# Patient Record
Sex: MALE | Race: WHITE | HISPANIC OR LATINO | Employment: FULL TIME | URBAN - METROPOLITAN AREA
[De-identification: names, ages, dates, MRNs, and addresses within clinical notes are randomized per-mention and may not be internally consistent; named-entity substitution may affect disease eponyms.]

---

## 2019-01-04 ENCOUNTER — TELEPHONE (OUTPATIENT)
Dept: SCHEDULING | Facility: IMAGING CENTER | Age: 44
End: 2019-01-04

## 2019-01-08 ENCOUNTER — OFFICE VISIT (OUTPATIENT)
Dept: MEDICAL GROUP | Facility: PHYSICIAN GROUP | Age: 44
End: 2019-01-08
Payer: COMMERCIAL

## 2019-01-08 ENCOUNTER — HOSPITAL ENCOUNTER (OUTPATIENT)
Facility: MEDICAL CENTER | Age: 44
End: 2019-01-08
Attending: FAMILY MEDICINE
Payer: COMMERCIAL

## 2019-01-08 VITALS
HEART RATE: 79 BPM | RESPIRATION RATE: 12 BRPM | DIASTOLIC BLOOD PRESSURE: 70 MMHG | OXYGEN SATURATION: 95 % | WEIGHT: 171 LBS | SYSTOLIC BLOOD PRESSURE: 122 MMHG | BODY MASS INDEX: 22.66 KG/M2 | HEIGHT: 73 IN | TEMPERATURE: 98.4 F

## 2019-01-08 DIAGNOSIS — F31.9 BIPOLAR 1 DISORDER (HCC): ICD-10-CM

## 2019-01-08 DIAGNOSIS — R35.0 URINARY FREQUENCY: ICD-10-CM

## 2019-01-08 DIAGNOSIS — Z13.220 ENCOUNTER FOR LIPID SCREENING FOR CARDIOVASCULAR DISEASE: ICD-10-CM

## 2019-01-08 DIAGNOSIS — Z13.6 ENCOUNTER FOR LIPID SCREENING FOR CARDIOVASCULAR DISEASE: ICD-10-CM

## 2019-01-08 LAB
APPEARANCE UR: CLEAR
BILIRUB UR STRIP-MCNC: NEGATIVE MG/DL
COLOR UR AUTO: YELLOW
GLUCOSE UR STRIP.AUTO-MCNC: NEGATIVE MG/DL
KETONES UR STRIP.AUTO-MCNC: NORMAL MG/DL
LEUKOCYTE ESTERASE UR QL STRIP.AUTO: NEGATIVE
NITRITE UR QL STRIP.AUTO: NEGATIVE
PH UR STRIP.AUTO: 6 [PH] (ref 5–8)
PROT UR QL STRIP: NEGATIVE MG/DL
RBC UR QL AUTO: NEGATIVE
SP GR UR STRIP.AUTO: 1.03
UROBILINOGEN UR STRIP-MCNC: NEGATIVE MG/DL

## 2019-01-08 PROCEDURE — 87086 URINE CULTURE/COLONY COUNT: CPT

## 2019-01-08 PROCEDURE — 81002 URINALYSIS NONAUTO W/O SCOPE: CPT | Performed by: FAMILY MEDICINE

## 2019-01-08 PROCEDURE — 99204 OFFICE O/P NEW MOD 45 MIN: CPT | Performed by: FAMILY MEDICINE

## 2019-01-08 RX ORDER — SULFAMETHOXAZOLE AND TRIMETHOPRIM 800; 160 MG/1; MG/1
1 TABLET ORAL 2 TIMES DAILY
Qty: 14 TAB | Refills: 0 | Status: SHIPPED | OUTPATIENT
Start: 2019-01-08 | End: 2019-01-22 | Stop reason: SDUPTHER

## 2019-01-08 ASSESSMENT — PATIENT HEALTH QUESTIONNAIRE - PHQ9: CLINICAL INTERPRETATION OF PHQ2 SCORE: 0

## 2019-01-08 NOTE — ASSESSMENT & PLAN NOTE
He was diagnosed with bipolar type 1 in the past, about 10 years ago.  He has been off the medications for almost 8 years due to lifestyle changes.  He denies active symptoms in the last 8 years.

## 2019-01-08 NOTE — ASSESSMENT & PLAN NOTE
He has had about 6 months of urinary symptoms.  He describes urinary frequency and dysuria and some feeling of incomplete emptying.  He had some clear discharge that resolved.  He denies fevers or back pain.  He was tested for ghonorrhea, trich, chlamydia and herpes and tests were negative since symptoms started.    He is an ultrarunner and cross trains with cycling.

## 2019-01-11 LAB
BACTERIA UR CULT: NORMAL
SIGNIFICANT IND 70042: NORMAL
SITE SITE: NORMAL
SOURCE SOURCE: NORMAL

## 2019-01-13 NOTE — PROGRESS NOTES
CC:  Urinary issues    HISTORY OF THE PRESENT ILLNESS: Patient is a 43 y.o. male. This pleasant patient is here today discuss the following and establish care    Health Maintenance: Completed      Bipolar 1 disorder (HCC)  He was diagnosed with bipolar type 1 in the past, about 10 years ago.  He has been off the medications for almost 8 years due to lifestyle changes.  He denies active symptoms in the last 8 years.    Urinary frequency  He has had about 6 months of urinary symptoms.  He describes urinary frequency and dysuria and some feeling of incomplete emptying.  He had some clear discharge that resolved.  He denies fevers or back pain.  He was tested for ghonorrhea, trich, chlamydia and herpes and tests were negative since symptoms started.    He is an ultrarunner and cross trains with cycling.      Allergies: Ciprofloxacin    Current Outpatient Prescriptions Ordered in [facility]   Medication Sig Dispense Refill   • sulfamethoxazole-trimethoprim (BACTRIM DS) 800-160 MG tablet Take 1 Tab by mouth 2 times a day. 14 Tab 0   • therapeutic multivitamin-minerals (THERAGRAN-M) TABS Take 1 Tab by mouth every day.       No current [facility]-ordered facility-administered medications on file.        Past Medical History:   Diagnosis Date   • Psychiatric problem     HX OF BIPOLAR   • Sleep apnea     PREVIOUS HX--NONE SINCE WT LOSS  LBS       Past Surgical History:   Procedure Laterality Date   • KNEE ARTHROSCOPY  11/4/2013    Performed by Ammon Quezada III, M.D. at SURGERY [facility]   • DENTAL EXTRACTION(S)     • NASAL FRACTURE REDUCTION CLOSED         Social History   Substance Use Topics   • Smoking status: Former Smoker     Packs/day: 1.50     Years: 20.00     Types: Cigarettes     Quit date: 11/1/2008   • Smokeless tobacco: Never Used   • Alcohol use No       Social History     Social History Narrative    Ultrarunner - [employer]           Family History   Problem Relation Age of Onset   • Heart  "Attack Father 38   • Heart Disease Father    • Hyperlipidemia Father    • Hypertension Father    • Breast Cancer Maternal Grandmother    • Diabetes Paternal Grandfather        ROS:     - Constitutional: Negative for fever, chills, unexpected weight change, and fatigue/generalized weakness.     - HEENT: Negative for headaches, vision changes, hearing changes, ear pain, ear discharge, rhinorrhea, sinus congestion, sore throat, and neck pain.      - Respiratory: Negative for cough, sputum production, chest congestion, dyspnea, wheezing, and crackles.      - Cardiovascular: Negative for chest pain, palpitations, orthopnea, and bilateral lower extremity edema.     - Gastrointestinal: Negative for heartburn, nausea, vomiting, abdominal pain, hematochezia, melena, diarrhea, constipation, and greasy/foul-smelling stools.     - Genitourinary: Negative for  hematuria, pyuria and urinary incontinence.    - Musculoskeletal: Negative for myalgias, back pain, and joint pain.     - Skin: Negative for rash, itching, cyanotic skin color change.     - Neurological: Negative for dizziness, tingling, tremors, focal sensory deficit, focal weakness and headaches.     - Endo/Heme/Allergies: Does not bruise/bleed easily.     - Psychiatric/Behavioral: Negative for depression, suicidal/homicidal ideation and memory loss.        Exam: Blood pressure 122/70, pulse 79, temperature 36.9 °C (98.4 °F), resp. rate 12, height 1.854 m (6' 1\"), weight 77.6 kg (171 lb), SpO2 95 %. Body mass index is 22.56 kg/m².    General: Normal appearing. No distress.  HEENT: Normocephalic. Eyes conjunctiva clear lids without ptosis, pupils equal and reactive to light accommodation, ears normal shape and contour, canals are clear bilaterally, tympanic membranes are benign, nasal mucosa benign, oropharynx is without erythema, edema or exudates.   Neck: Supple without JVD or bruit. Thyroid is not enlarged.  Pulmonary: Clear to ausculation.  Normal effort. No rales, " ronchi, or wheezing.  Cardiovascular: Regular rate and rhythm without murmur. Carotid and radial pulses are intact and equal bilaterally.  Abdomen: Soft, nontender, nondistended. Normal bowel sounds. Liver and spleen are not palpable  Prostate: (chaperone: Herve Velasco) mildly boggy and enlarged prostate without masses, tenderness lasted after the exam  Neurologic: Grossly nonfocal  Lymph: No cervical, supraclavicular or axillary lymph nodes are palpable  Skin: Warm and dry.  No obvious lesions.  Musculoskeletal: Normal gait. No extremity cyanosis, clubbing, or edema.  Psych: Normal mood and affect. Alert and oriented x3. Judgment and insight is normal.    Please note that this dictation was created using voice recognition software. I have made every reasonable attempt to correct obvious errors, but I expect that there are errors of grammar and possibly content that I did not discover before finalizing the note.      Assessment/Plan  1. Urinary frequency  Symptoms are recurrent and infection testing has been negative with the except of urine culture which I have not seen results for.  Given his age and exam prostatitis is possible.  We will start with antibiotics and urine culture.  I have asked him to avoid long bike rides for now.  We will check PSA as well.  If symptoms persist BPH is possible and flomax could be considered at follow up.  - POCT Urinalysis  - PROSTATE SPECIFIC AG SCREENING; Future  - URINE CULTURE(NEW); Future  - sulfamethoxazole-trimethoprim (BACTRIM DS) 800-160 MG tablet; Take 1 Tab by mouth 2 times a day.  Dispense: 14 Tab; Refill: 0    2. Bipolar 1 disorder (HCC)  Symptoms are stable with lifestyle management.  We will monitor, he is not a danger to himself..    3. Encounter for lipid screening for cardiovascular disease  Routine screening ordered with follow up labs for his urinary symptoms  - Lipid Profile; Future

## 2019-01-17 ENCOUNTER — HOSPITAL ENCOUNTER (OUTPATIENT)
Dept: LAB | Facility: MEDICAL CENTER | Age: 44
End: 2019-01-17
Attending: FAMILY MEDICINE
Payer: COMMERCIAL

## 2019-01-17 DIAGNOSIS — Z13.220 ENCOUNTER FOR LIPID SCREENING FOR CARDIOVASCULAR DISEASE: ICD-10-CM

## 2019-01-17 DIAGNOSIS — Z13.6 ENCOUNTER FOR LIPID SCREENING FOR CARDIOVASCULAR DISEASE: ICD-10-CM

## 2019-01-17 DIAGNOSIS — R35.0 URINARY FREQUENCY: ICD-10-CM

## 2019-01-17 LAB
CHOLEST SERPL-MCNC: 212 MG/DL (ref 100–199)
FASTING STATUS PATIENT QL REPORTED: NORMAL
HDLC SERPL-MCNC: 100 MG/DL
LDLC SERPL CALC-MCNC: 105 MG/DL
PSA SERPL-MCNC: 0.54 NG/ML (ref 0–4)
TRIGL SERPL-MCNC: 35 MG/DL (ref 0–149)

## 2019-01-17 PROCEDURE — 80061 LIPID PANEL: CPT

## 2019-01-17 PROCEDURE — 36415 COLL VENOUS BLD VENIPUNCTURE: CPT

## 2019-01-17 PROCEDURE — 84153 ASSAY OF PSA TOTAL: CPT

## 2019-01-22 ENCOUNTER — OFFICE VISIT (OUTPATIENT)
Dept: MEDICAL GROUP | Facility: PHYSICIAN GROUP | Age: 44
End: 2019-01-22
Payer: COMMERCIAL

## 2019-01-22 VITALS
TEMPERATURE: 98.4 F | WEIGHT: 178 LBS | HEART RATE: 78 BPM | OXYGEN SATURATION: 98 % | HEIGHT: 73 IN | BODY MASS INDEX: 23.59 KG/M2 | SYSTOLIC BLOOD PRESSURE: 124 MMHG | DIASTOLIC BLOOD PRESSURE: 78 MMHG | RESPIRATION RATE: 14 BRPM

## 2019-01-22 DIAGNOSIS — Z23 NEED FOR VACCINATION: ICD-10-CM

## 2019-01-22 DIAGNOSIS — E78.2 MIXED HYPERLIPIDEMIA: ICD-10-CM

## 2019-01-22 DIAGNOSIS — N41.1 CHRONIC PROSTATITIS: ICD-10-CM

## 2019-01-22 DIAGNOSIS — R35.0 URINARY FREQUENCY: ICD-10-CM

## 2019-01-22 PROBLEM — N41.9 PROSTATITIS: Status: ACTIVE | Noted: 2019-01-08

## 2019-01-22 PROCEDURE — 99214 OFFICE O/P EST MOD 30 MIN: CPT | Mod: 25 | Performed by: FAMILY MEDICINE

## 2019-01-22 PROCEDURE — 90686 IIV4 VACC NO PRSV 0.5 ML IM: CPT | Performed by: FAMILY MEDICINE

## 2019-01-22 PROCEDURE — 90471 IMMUNIZATION ADMIN: CPT | Performed by: FAMILY MEDICINE

## 2019-01-22 RX ORDER — SULFAMETHOXAZOLE AND TRIMETHOPRIM 800; 160 MG/1; MG/1
1 TABLET ORAL 2 TIMES DAILY
Qty: 14 TAB | Refills: 0 | Status: SHIPPED | OUTPATIENT
Start: 2019-01-22 | End: 2019-10-08

## 2019-01-22 NOTE — ASSESSMENT & PLAN NOTE
The 10-year ASCVD risk score (Zoraida DUMOTN Jr., et al., 2013) is: 0.9%    Values used to calculate the score:      Age: 44 years      Sex: Male      Is Non- : No      Diabetic: No      Tobacco smoker: No      Systolic Blood Pressure: 124 mmHg      Is BP treated: No      HDL Cholesterol: 100 mg/dL      Total Cholesterol: 212 mg/dL    He has a family history of early heart disease, his father having a heart attack at age 38.

## 2019-01-22 NOTE — PROGRESS NOTES
CC: I'm feeling better    HISTORY OF PRESENT ILLNESS: Patient is a 44 y.o. male established patient who presents today to discuss the following    Health Maintenance: Completed    Mixed hyperlipidemia  The 10-year ASCVD risk score (Zoraida DUMONT Jr., et al., 2013) is: 0.9%    Values used to calculate the score:      Age: 44 years      Sex: Male      Is Non- : No      Diabetic: No      Tobacco smoker: No      Systolic Blood Pressure: 124 mmHg      Is BP treated: No      HDL Cholesterol: 100 mg/dL      Total Cholesterol: 212 mg/dL    He has a family history of early heart disease, his father having a heart attack at age 38.      Prostatitis  He has completed 2 weeks of bactrim after last visit for suspected prostatitis.  He reports improvement in his urinary frequency on this but has some residual symptoms.  He denies fevers, blood in the urine or abdominal pain.  He does drink coffee.  His labs were all normal including a urine culture and PSA level.      Patient Active Problem List    Diagnosis Date Noted   • Mixed hyperlipidemia 01/22/2019   • Bipolar 1 disorder (HCC) 01/08/2019   • Prostatitis 01/08/2019      Allergies:Ciprofloxacin    Current Outpatient Prescriptions   Medication Sig Dispense Refill   • sulfamethoxazole-trimethoprim (BACTRIM DS) 800-160 MG tablet Take 1 Tab by mouth 2 times a day. 14 Tab 0   • therapeutic multivitamin-minerals (THERAGRAN-M) TABS Take 1 Tab by mouth every day.       No current facility-administered medications for this visit.        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           Family History   Problem Relation Age of Onset   • Heart Attack Father 38   • Heart Disease Father    • Hyperlipidemia  "Father    • Hypertension Father    • Breast Cancer Maternal Grandmother    • Diabetes Paternal Grandfather        Review of Systems:      - Constitutional: Negative for fever, chills, unexpected weight change, and fatigue/generalized weakness.     - Cardiovascular: Negative for chest pain, palpitations, orthopnea, and bilateral lower extremity edema.     - Gastrointestinal: Negative for heartburn, nausea, vomiting, abdominal pain, hematochezia, melena, diarrhea, constipation, and greasy/foul-smelling stools.     - Genitourinary: Negative for dysuria, polyuria, hematuria, pyuria, urinary urgency, and urinary incontinence.       Exam:    Blood pressure 124/78, pulse 78, temperature 36.9 °C (98.4 °F), resp. rate 14, height 1.854 m (6' 1\"), weight 80.7 kg (178 lb), SpO2 98 %. Body mass index is 23.48 kg/m².    General:  Well nourished, well developed male in NAD    Please note that this dictation was created using voice recognition software. I have made every reasonable attempt to correct obvious errors, but I expect that there are errors of grammar and possibly content that I did not discover before finalizing the note.    Assessment/Plan:  1. Mixed hyperlipidemia  his 10 year cardiovascular risk is not elevated.  The recommendations regarding treatment of elevated cholesterol were reviewed.  he does not meet criteria for treatment at this time with aspirin and a statin.  Lifestyle interventions including exercise and a diet low in trans and saturated fat and added cholesterol were reviewed.  We will continue to monitor every 1-5 years.       2. Urinary frequency  He has experienced improvement on bactrim after two weeks but has residual symptoms.  I suspect prostatitis as the most likely cause.  Bladder irritability or BPH less likely given recent improvement.  Cancer unlikely with a normal PSA.  - sulfamethoxazole-trimethoprim (BACTRIM DS) 800-160 MG tablet; Take 1 Tab by mouth 2 times a day.  Dispense: 14 Tab; " Refill: 0    3. Need for vaccination  - Flu Quad Inj >3 Year Pre-Filled PF    4. Chronic prostatitis  We will extend the course of antibiotics an additional two weeks which is not uncommonly required in prostatitis.  I have encouraged him to take a probiotic while on antibiotics, which he says he is taking already.  Potential food irritants including coffee, citrus and spicy foods were also reviewed and he should consider avoiding them if he does not improve after the antibiotics.

## 2019-01-22 NOTE — ASSESSMENT & PLAN NOTE
He has completed 2 weeks of bactrim after last visit for suspected prostatitis.  He reports improvement in his urinary frequency on this but has some residual symptoms.  He denies fevers, blood in the urine or abdominal pain.  He does drink coffee.  His labs were all normal including a urine culture and PSA level.

## 2019-10-08 ENCOUNTER — OFFICE VISIT (OUTPATIENT)
Dept: MEDICAL GROUP | Facility: PHYSICIAN GROUP | Age: 44
End: 2019-10-08
Payer: COMMERCIAL

## 2019-10-08 VITALS
RESPIRATION RATE: 12 BRPM | DIASTOLIC BLOOD PRESSURE: 68 MMHG | HEART RATE: 58 BPM | SYSTOLIC BLOOD PRESSURE: 110 MMHG | WEIGHT: 175 LBS | BODY MASS INDEX: 23.7 KG/M2 | HEIGHT: 72 IN | OXYGEN SATURATION: 98 % | TEMPERATURE: 98 F

## 2019-10-08 DIAGNOSIS — R22.42 LEG MASS, LEFT: ICD-10-CM

## 2019-10-08 DIAGNOSIS — M79.605 LEFT LEG PAIN: ICD-10-CM

## 2019-10-08 PROCEDURE — 99214 OFFICE O/P EST MOD 30 MIN: CPT | Performed by: FAMILY MEDICINE

## 2019-10-08 NOTE — PROGRESS NOTES
"  CC: left leg pain    HISTORY OF PRESENT ILLNESS: Patient is a 44 y.o. male established patient who presents today to discuss the following new problem    Health Maintenance: Completed    Left leg pain  He had sudden onset of left posterior thigh pain that started during a run.  He gets swelling in the hamstrings when running, but not spinning or walking.  This started about 3.5 weeks ago.  The pain is only present with running, but is better when wearing leggings.  There is associated swelling in the posterior calf and bruising.  He has had knee surgery and there is posterior knee \"tightness.\"      Patient Active Problem List    Diagnosis Date Noted   • Left leg pain 10/08/2019   • Mixed hyperlipidemia 01/22/2019   • Bipolar 1 disorder (HCC) 01/08/2019   • Prostatitis 01/08/2019      Allergies:Ciprofloxacin    Current Outpatient Medications   Medication Sig Dispense Refill   • sulfamethoxazole-trimethoprim (BACTRIM DS) 800-160 MG tablet Take 1 Tab by mouth 2 times a day. 14 Tab 0   • therapeutic multivitamin-minerals (THERAGRAN-M) TABS Take 1 Tab by mouth every day.       No current facility-administered medications for this visit.        Social History     Tobacco Use   • Smoking status: Former Smoker     Packs/day: 1.50     Years: 20.00     Pack years: 30.00     Types: Cigarettes     Last attempt to quit: 11/1/2008     Years since quitting: 10.9   • Smokeless tobacco: Never Used   Substance Use Topics   • Alcohol use: No   • Drug use: No     Social History     Social History Narrative    [employer]ar[employer] - [employer]           Family History   Problem Relation Age of Onset   • Heart Attack Father 38   • Heart Disease Father    • Hyperlipidemia Father    • Hypertension Father    • Breast Cancer Maternal Grandmother    • Diabetes Paternal Grandfather        Review of Systems:      - Constitutional: Negative for fever, chills, unexpected weight change, and fatigue/generalized weakness.     - Skin: " "Negative for rash, itching, cyanotic skin color change.     - Neurological: Negative for dizziness, tingling, tremors, focal sensory deficit, focal weakness and headaches.         Exam:    /68   Pulse (!) 58   Temp 36.7 °C (98 °F)   Resp 12   Ht 1.816 m (5' 11.5\")   Wt 79.4 kg (175 lb)   SpO2 98%  Body mass index is 24.07 kg/m².    General:  Well nourished, well developed male in NAD  Head is grossly normal.  Musculoskeletal: left posterior leg with lateral fullness, pain with resisted knee flexion, palpation reveals induration in the lateral hamstrings extending to just proximal to the knee, no joint effusion or bursa, no transillumination  Skin: bruising of the left posterior knee and upper calf      Assessment/Plan:  1. Left leg pain  Symptoms are most suggestive of a muscle tear with hematoma which would explain the pain with resisted flexion, his symptoms and the induration.  There are no symptoms to suggest infection, but with the palpable mass I would like to evaluate further.  It will also be helpful given his athletic status.  I have asked him to limit exercise to activities that do not provoke the pain for now and we will get an MRI.  - MR-FEMUR-W/O LEFT; Future    2. Leg mass, left  Possibly hematoma, but would like to evaluate further to rule out less likely dangerous causes as malignancy or other mass as there was no definite trauma to cause this.  - MR-FEMUR-W/O LEFT; Future         "

## 2019-10-08 NOTE — ASSESSMENT & PLAN NOTE
"He had sudden onset of left posterior thigh pain that started during a run.  He gets swelling in the hamstrings when running, but not spinning or walking.  This started about 3.5 weeks ago.  The pain is only present with running, but is better when wearing leggings.  There is associated swelling in the posterior calf and bruising.  He has had knee surgery and there is posterior knee \"tightness.\"  "

## 2019-10-11 ENCOUNTER — HOSPITAL ENCOUNTER (OUTPATIENT)
Dept: RADIOLOGY | Facility: MEDICAL CENTER | Age: 44
End: 2019-10-11
Attending: FAMILY MEDICINE
Payer: COMMERCIAL

## 2019-10-11 DIAGNOSIS — F31.9 BIPOLAR 1 DISORDER (HCC): ICD-10-CM

## 2019-10-11 NOTE — PROGRESS NOTES
Patient has hx of bipolar disorder. He requested referral to psychiatry.   PCP is on vacation. Psychiatry referral placed.

## 2019-11-07 ENCOUNTER — HOSPITAL ENCOUNTER (OUTPATIENT)
Dept: RADIOLOGY | Facility: MEDICAL CENTER | Age: 44
End: 2019-11-07
Attending: FAMILY MEDICINE
Payer: COMMERCIAL

## 2019-11-07 DIAGNOSIS — M79.605 LEFT LEG PAIN: ICD-10-CM

## 2019-11-07 DIAGNOSIS — R22.42 LEG MASS, LEFT: ICD-10-CM

## 2019-11-07 PROCEDURE — A9576 INJ PROHANCE MULTIPACK: HCPCS | Performed by: FAMILY MEDICINE

## 2019-11-07 PROCEDURE — 73720 MRI LWR EXTREMITY W/O&W/DYE: CPT | Mod: LT

## 2019-11-07 PROCEDURE — 700117 HCHG RX CONTRAST REV CODE 255: Performed by: FAMILY MEDICINE

## 2019-11-07 RX ADMIN — GADOTERIDOL 15 ML: 279.3 INJECTION, SOLUTION INTRAVENOUS at 08:52

## 2019-11-26 ENCOUNTER — OFFICE VISIT (OUTPATIENT)
Dept: MEDICAL GROUP | Facility: PHYSICIAN GROUP | Age: 44
End: 2019-11-26
Payer: COMMERCIAL

## 2019-11-26 ENCOUNTER — HOSPITAL ENCOUNTER (OUTPATIENT)
Dept: LAB | Facility: MEDICAL CENTER | Age: 44
End: 2019-11-26
Attending: FAMILY MEDICINE
Payer: COMMERCIAL

## 2019-11-26 VITALS
DIASTOLIC BLOOD PRESSURE: 66 MMHG | SYSTOLIC BLOOD PRESSURE: 124 MMHG | TEMPERATURE: 98.1 F | RESPIRATION RATE: 16 BRPM | OXYGEN SATURATION: 98 % | WEIGHT: 176 LBS | HEART RATE: 60 BPM | BODY MASS INDEX: 23.33 KG/M2 | HEIGHT: 73 IN

## 2019-11-26 DIAGNOSIS — Z00.00 WELLNESS EXAMINATION: ICD-10-CM

## 2019-11-26 DIAGNOSIS — E78.2 MIXED HYPERLIPIDEMIA: ICD-10-CM

## 2019-11-26 DIAGNOSIS — Z23 NEED FOR VACCINATION: ICD-10-CM

## 2019-11-26 LAB
ABO GROUP BLD: NORMAL
ALBUMIN SERPL BCP-MCNC: 4.3 G/DL (ref 3.2–4.9)
ALBUMIN/GLOB SERPL: 1.6 G/DL
ALP SERPL-CCNC: 45 U/L (ref 30–99)
ALT SERPL-CCNC: 30 U/L (ref 2–50)
ANION GAP SERPL CALC-SCNC: 6 MMOL/L (ref 0–11.9)
AST SERPL-CCNC: 27 U/L (ref 12–45)
BASOPHILS # BLD AUTO: 0.7 % (ref 0–1.8)
BASOPHILS # BLD: 0.03 K/UL (ref 0–0.12)
BILIRUB SERPL-MCNC: 0.8 MG/DL (ref 0.1–1.5)
BUN SERPL-MCNC: 20 MG/DL (ref 8–22)
CALCIUM SERPL-MCNC: 9.3 MG/DL (ref 8.5–10.5)
CHLORIDE SERPL-SCNC: 106 MMOL/L (ref 96–112)
CHOLEST SERPL-MCNC: 220 MG/DL (ref 100–199)
CO2 SERPL-SCNC: 30 MMOL/L (ref 20–33)
CREAT SERPL-MCNC: 0.88 MG/DL (ref 0.5–1.4)
EOSINOPHIL # BLD AUTO: 0.07 K/UL (ref 0–0.51)
EOSINOPHIL NFR BLD: 1.5 % (ref 0–6.9)
ERYTHROCYTE [DISTWIDTH] IN BLOOD BY AUTOMATED COUNT: 45.2 FL (ref 35.9–50)
FASTING STATUS PATIENT QL REPORTED: NORMAL
GLOBULIN SER CALC-MCNC: 2.7 G/DL (ref 1.9–3.5)
GLUCOSE SERPL-MCNC: 79 MG/DL (ref 65–99)
HCT VFR BLD AUTO: 43.9 % (ref 42–52)
HDLC SERPL-MCNC: 100 MG/DL
HGB BLD-MCNC: 15 G/DL (ref 14–18)
IMM GRANULOCYTES # BLD AUTO: 0 K/UL (ref 0–0.11)
IMM GRANULOCYTES NFR BLD AUTO: 0 % (ref 0–0.9)
LDLC SERPL CALC-MCNC: 113 MG/DL
LYMPHOCYTES # BLD AUTO: 1.86 K/UL (ref 1–4.8)
LYMPHOCYTES NFR BLD: 40.8 % (ref 22–41)
MCH RBC QN AUTO: 33 PG (ref 27–33)
MCHC RBC AUTO-ENTMCNC: 34.2 G/DL (ref 33.7–35.3)
MCV RBC AUTO: 96.5 FL (ref 81.4–97.8)
MONOCYTES # BLD AUTO: 0.48 K/UL (ref 0–0.85)
MONOCYTES NFR BLD AUTO: 10.5 % (ref 0–13.4)
NEUTROPHILS # BLD AUTO: 2.12 K/UL (ref 1.82–7.42)
NEUTROPHILS NFR BLD: 46.5 % (ref 44–72)
NRBC # BLD AUTO: 0 K/UL
NRBC BLD-RTO: 0 /100 WBC
PLATELET # BLD AUTO: 131 K/UL (ref 164–446)
PMV BLD AUTO: 10 FL (ref 9–12.9)
POTASSIUM SERPL-SCNC: 4.3 MMOL/L (ref 3.6–5.5)
PROT SERPL-MCNC: 7 G/DL (ref 6–8.2)
RBC # BLD AUTO: 4.55 M/UL (ref 4.7–6.1)
RH BLD: NORMAL
SODIUM SERPL-SCNC: 142 MMOL/L (ref 135–145)
TRIGL SERPL-MCNC: 36 MG/DL (ref 0–149)
WBC # BLD AUTO: 4.6 K/UL (ref 4.8–10.8)

## 2019-11-26 PROCEDURE — 36415 COLL VENOUS BLD VENIPUNCTURE: CPT

## 2019-11-26 PROCEDURE — 80053 COMPREHEN METABOLIC PANEL: CPT

## 2019-11-26 PROCEDURE — 90472 IMMUNIZATION ADMIN EACH ADD: CPT | Performed by: FAMILY MEDICINE

## 2019-11-26 PROCEDURE — 90715 TDAP VACCINE 7 YRS/> IM: CPT | Performed by: FAMILY MEDICINE

## 2019-11-26 PROCEDURE — 80061 LIPID PANEL: CPT

## 2019-11-26 PROCEDURE — 99396 PREV VISIT EST AGE 40-64: CPT | Mod: 25 | Performed by: FAMILY MEDICINE

## 2019-11-26 PROCEDURE — 85025 COMPLETE CBC W/AUTO DIFF WBC: CPT

## 2019-11-26 PROCEDURE — 86901 BLOOD TYPING SEROLOGIC RH(D): CPT

## 2019-11-26 PROCEDURE — 90471 IMMUNIZATION ADMIN: CPT | Performed by: FAMILY MEDICINE

## 2019-11-26 PROCEDURE — 86900 BLOOD TYPING SEROLOGIC ABO: CPT

## 2019-11-26 PROCEDURE — 90686 IIV4 VACC NO PRSV 0.5 ML IM: CPT | Performed by: FAMILY MEDICINE

## 2019-11-26 SDOH — HEALTH STABILITY: PHYSICAL HEALTH: ON AVERAGE, HOW MANY DAYS PER WEEK DO YOU ENGAGE IN MODERATE TO STRENUOUS EXERCISE (LIKE A BRISK WALK)?: 6 DAYS

## 2019-11-26 SDOH — HEALTH STABILITY: PHYSICAL HEALTH: ON AVERAGE, HOW MANY MINUTES DO YOU ENGAGE IN EXERCISE AT THIS LEVEL?: 90 MIN

## 2019-11-26 SDOH — HEALTH STABILITY: MENTAL HEALTH
STRESS IS WHEN SOMEONE FEELS TENSE, NERVOUS, ANXIOUS, OR CAN'T SLEEP AT NIGHT BECAUSE THEIR MIND IS TROUBLED. HOW STRESSED ARE YOU?: NOT AT ALL

## 2019-11-26 NOTE — ASSESSMENT & PLAN NOTE
Lab Results   Component Value Date/Time    CHOLSTRLTOT 212 (H) 01/17/2019 08:52 AM     (H) 01/17/2019 08:52 AM     01/17/2019 08:52 AM    TRIGLYCERIDE 35 01/17/2019 08:52 AM       Last LDL was slightly high.  He does have a family history of early heart disease in his dad.

## 2019-11-26 NOTE — ASSESSMENT & PLAN NOTE
He is here for a wellness visit today.  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  He denies any concerns about stress.  He is feeling well and has not concerns.    He would like to get wellness labs ordered and is curious about his blood type.

## 2019-11-29 ENCOUNTER — PATIENT MESSAGE (OUTPATIENT)
Dept: MEDICAL GROUP | Facility: PHYSICIAN GROUP | Age: 44
End: 2019-11-29

## 2019-11-29 DIAGNOSIS — D69.6 THROMBOCYTOPENIA (HCC): ICD-10-CM

## 2019-12-05 ENCOUNTER — PATIENT MESSAGE (OUTPATIENT)
Dept: MEDICAL GROUP | Facility: PHYSICIAN GROUP | Age: 44
End: 2019-12-05

## 2019-12-05 NOTE — TELEPHONE ENCOUNTER
From: Fuentes Jorge Hawk  To: Lorri Wood M.D.  Sent: 12/5/2019 5:57 AM PST  Subject: Non-Urgent Medical Question    Thank you! I will plan to come in for a follow up lab in January. Let's hope it's nothing! Happy holidays!      ----- Message -----  From: Lorri Wood M.D.  Sent: 12/2/19, 7:13 PM  To: Fuentes Floresado  Subject: RE: Non-Urgent Medical Question    Ronen Dill,    I wrote you a message with the lab result with more details.    In short, this may be normal, but if it sticks around we'll need to look into it more. I have ordered a follow up CBC for 4-12 weeks which you can have done in that time frame in any [facility]. We'll follow up at that point if it is still abnormal.     Best,    Dr. Wood    ----- Message -----   From: Fuentes Shawtristin Hawk   Sent: 11/29/2019  8:34 AM PST   To: Lorri Wood M.D.  Subject: Non-Urgent Medical Question    I have a question about CBC with Differential resulted on 11/26/19, 8:05 PM.    Are there any causes for concern with the low platelet count and low red and white cell count? Just curious. Thanks!

## 2019-12-17 ENCOUNTER — PATIENT MESSAGE (OUTPATIENT)
Dept: MEDICAL GROUP | Facility: PHYSICIAN GROUP | Age: 44
End: 2019-12-17

## 2019-12-17 DIAGNOSIS — D69.6 THROMBOCYTOPENIA (HCC): ICD-10-CM

## 2019-12-17 NOTE — TELEPHONE ENCOUNTER
From: Fuentes Jorge Hawk  To: Lorri Wood M.D.  Sent: 12/17/2019 10:00 AM PST  Subject: Non-Urgent Medical Question    Can we add ferritin levels to the lab work? Just wanted to see where my iron stores are at too. Thanks!  FUENTES     ----- Message -----  From: Lorri Wood M.D.  Sent: 12/6/19, 4:00 PM  To: Fuentes Zamoradonado  Subject: RE: Non-Urgent Medical Question    Agreed, happy holidays!    Dr. Wood      ----- Message -----   From: Fuentes Jorge Hawk   Sent: 12/5/2019 5:57 AM PST   To: Lorri Wood M.D.  Subject: Non-Urgent Medical Question    Thank you! I will plan to come in for a follow up lab in January. Let's hope it's nothing! Happy holidays!  EJ    ----- Message -----  From: Lorri Wood M.D.  Sent: 12/2/19, 7:13 PM  To: Fuentes Floresado  Subject: RE: Non-Urgent Medical Question    Hi Fuentes,    I wrote you a message with the lab result with more details.    In short, this may be normal, but if it sticks around we'll need to look into it more. I have ordered a follow up CBC for 4-12 weeks which you can have done in that time frame in any [facility]. We'll follow up at that point if it is still abnormal.     Best,    Dr. Wood    ----- Message -----   From: Fuentes Jorge Hawk   Sent: 11/29/2019 8:34 AM PST   To: Lorri Wood M.D.  Subject: Non-Urgent Medical Question    I have a question about CBC with Differential resulted on 11/26/19, 8:05 PM.    Are there any causes for concern with the low platelet count and low red and white cell count? Just curious. Thanks!

## 2020-01-07 ENCOUNTER — HOSPITAL ENCOUNTER (OUTPATIENT)
Dept: LAB | Facility: MEDICAL CENTER | Age: 45
End: 2020-01-07
Attending: FAMILY MEDICINE
Payer: COMMERCIAL

## 2020-01-07 DIAGNOSIS — D69.6 THROMBOCYTOPENIA (HCC): ICD-10-CM

## 2020-01-07 LAB
BASOPHILS # BLD AUTO: 0.7 % (ref 0–1.8)
BASOPHILS # BLD: 0.03 K/UL (ref 0–0.12)
EOSINOPHIL # BLD AUTO: 0.04 K/UL (ref 0–0.51)
EOSINOPHIL NFR BLD: 0.9 % (ref 0–6.9)
ERYTHROCYTE [DISTWIDTH] IN BLOOD BY AUTOMATED COUNT: 46.4 FL (ref 35.9–50)
HCT VFR BLD AUTO: 43 % (ref 42–52)
HGB BLD-MCNC: 14.3 G/DL (ref 14–18)
IMM GRANULOCYTES # BLD AUTO: 0.01 K/UL (ref 0–0.11)
IMM GRANULOCYTES NFR BLD AUTO: 0.2 % (ref 0–0.9)
LYMPHOCYTES # BLD AUTO: 1.94 K/UL (ref 1–4.8)
LYMPHOCYTES NFR BLD: 44.9 % (ref 22–41)
MCH RBC QN AUTO: 32.1 PG (ref 27–33)
MCHC RBC AUTO-ENTMCNC: 33.3 G/DL (ref 33.7–35.3)
MCV RBC AUTO: 96.4 FL (ref 81.4–97.8)
MONOCYTES # BLD AUTO: 0.44 K/UL (ref 0–0.85)
MONOCYTES NFR BLD AUTO: 10.2 % (ref 0–13.4)
NEUTROPHILS # BLD AUTO: 1.86 K/UL (ref 1.82–7.42)
NEUTROPHILS NFR BLD: 43.1 % (ref 44–72)
NRBC # BLD AUTO: 0 K/UL
NRBC BLD-RTO: 0 /100 WBC
PLATELET # BLD AUTO: 159 K/UL (ref 164–446)
PMV BLD AUTO: 10.6 FL (ref 9–12.9)
RBC # BLD AUTO: 4.46 M/UL (ref 4.7–6.1)
WBC # BLD AUTO: 4.3 K/UL (ref 4.8–10.8)

## 2020-01-07 PROCEDURE — 36415 COLL VENOUS BLD VENIPUNCTURE: CPT

## 2020-01-07 PROCEDURE — 85025 COMPLETE CBC W/AUTO DIFF WBC: CPT

## 2020-01-07 PROCEDURE — 82728 ASSAY OF FERRITIN: CPT

## 2020-01-08 LAB — FERRITIN SERPL-MCNC: 39.2 NG/ML (ref 22–322)

## 2020-01-09 ENCOUNTER — PATIENT MESSAGE (OUTPATIENT)
Dept: MEDICAL GROUP | Facility: PHYSICIAN GROUP | Age: 45
End: 2020-01-09

## 2020-01-09 DIAGNOSIS — D70.8 OTHER NEUTROPENIA (HCC): ICD-10-CM

## 2020-01-09 DIAGNOSIS — D69.6 THROMBOCYTOPENIA (HCC): ICD-10-CM

## 2020-01-10 ENCOUNTER — HOSPITAL ENCOUNTER (OUTPATIENT)
Dept: LAB | Facility: MEDICAL CENTER | Age: 45
End: 2020-01-10
Attending: FAMILY MEDICINE
Payer: COMMERCIAL

## 2020-01-10 DIAGNOSIS — D70.8 OTHER NEUTROPENIA (HCC): ICD-10-CM

## 2020-01-10 DIAGNOSIS — D69.6 THROMBOCYTOPENIA (HCC): ICD-10-CM

## 2020-01-10 LAB
FOLATE SERPL-MCNC: 23 NG/ML
HIV 1+2 AB+HIV1 P24 AG SERPL QL IA: NON REACTIVE
VIT B12 SERPL-MCNC: 373 PG/ML (ref 211–911)

## 2020-01-10 PROCEDURE — 82746 ASSAY OF FOLIC ACID SERUM: CPT

## 2020-01-10 PROCEDURE — 87389 HIV-1 AG W/HIV-1&-2 AB AG IA: CPT

## 2020-01-10 PROCEDURE — 82525 ASSAY OF COPPER: CPT

## 2020-01-10 PROCEDURE — 86038 ANTINUCLEAR ANTIBODIES: CPT

## 2020-01-10 PROCEDURE — 82607 VITAMIN B-12: CPT

## 2020-01-10 PROCEDURE — 36415 COLL VENOUS BLD VENIPUNCTURE: CPT

## 2020-01-10 NOTE — TELEPHONE ENCOUNTER
From: Fuentes Hawk  To: Lorri Wood M.D.  Sent: 1/9/2020 1:19 PM PST  Subject: Test Result Question    Also, did the ferritin levels come back normal?    ----- Message -----  From: Lorri Wood M.D.  Sent: 1/9/20, 12:59 PM  To: Fuentes Floresado  Subject: RE: Test Result Question    HI Fuentes,    I would say this was mostly stable, but it still not completely normal. This is lower risk at this time (you should not have a major bleeding issue at that level of platelets) and despite the slightly low WBC you have not been getting sick. Sometimes this can be genetic.    I will also order an HIV and some nutrition and autoimmune labs to look for possible causes. I would have them done when convenient and then we can follow up to look at the results.    Best,    Dr. Wood    ----- Message -----   From: Fuentes Hawk   Sent: 1/9/2020 10:47 AM PST   To: Lorri Wood M.D.  Subject: Test Result Question    I have a question about CBC with Differential resulted on 1/7/20, 9:29 PM.    Sooooo, looking at the new labs. It appears that red and white cells are even lower along with platelet levels. I also see the MCHC is really low as are neutrophils-poly, and the lymphocytes are high. Please advise. Thanks in advance!  EJ

## 2020-01-10 NOTE — TELEPHONE ENCOUNTER
From: Fuentes Hawk  To: Lorri Wood M.D.  Sent: 1/9/2020 1:18 PM PST  Subject: Test Result Question    Thanks for the response.     I can say that I have been more sick in 2019 than I have been in the past 8 years. I had a series of colds and chest congestion at different periods of last year, which is not normal for me. Not sure if this has any relevance with what's happening.     I can come in tomorrow morning for labs. Do I need to fast?    ----- Message -----  From: Lorri Wood M.D.  Sent: 1/9/20, 12:59 PM  To: Fuentes Patel Hakw  Subject: RE: Test Result Question    HI Fuentes,    I would say this was mostly stable, but it still not completely normal. This is lower risk at this time (you should not have a major bleeding issue at that level of platelets) and despite the slightly low WBC you have not been getting sick. Sometimes this can be genetic.    I will also order an HIV and some nutrition and autoimmune labs to look for possible causes. I would have them done when convenient and then we can follow up to look at the results.    Best,    Dr. Wood    ----- Message -----   From: Fuentes Hawk   Sent: 1/9/2020 10:47 AM PST   To: Lorri Wood M.D.  Subject: Test Result Question    I have a question about CBC with Differential resulted on 1/7/20, 9:29 PM.    Sooooo, looking at the new labs. It appears that red and white cells are even lower along with platelet levels. I also see the MCHC is really low as are neutrophils-poly, and the lymphocytes are high. Please advise. Thanks in advance!  EJ

## 2020-01-12 LAB — NUCLEAR IGG SER QL IA: NORMAL

## 2020-01-13 LAB — COPPER SERPL-MCNC: 86.2 UG/DL (ref 70–140)

## 2020-01-16 ENCOUNTER — PATIENT MESSAGE (OUTPATIENT)
Dept: MEDICAL GROUP | Facility: PHYSICIAN GROUP | Age: 45
End: 2020-01-16

## 2020-01-20 ENCOUNTER — PATIENT MESSAGE (OUTPATIENT)
Dept: MEDICAL GROUP | Facility: PHYSICIAN GROUP | Age: 45
End: 2020-01-20

## 2020-01-20 DIAGNOSIS — R79.0 LOW IRON STORES: ICD-10-CM

## 2020-01-21 NOTE — TELEPHONE ENCOUNTER
From: Fuentes Hawk  To: Lorri Wood M.D.  Sent: 1/20/2020 7:33 PM PST  Subject: Non-Urgent Medical Question    Thanks so much for the reply. I do take [place] Light Men's One, turmeric, probiotic, and Vit C. Is it possible to get a script for the iron supplement, since it is cheaper than buying it over the counter? I'm just making sure that I do what I need for my body.     Just curious about my other levels (RBC, WBC, platelets, etc.). Are these no cause for concern at this time.     Thanks in advance!  EJ

## 2020-01-22 ENCOUNTER — PATIENT MESSAGE (OUTPATIENT)
Dept: MEDICAL GROUP | Facility: PHYSICIAN GROUP | Age: 45
End: 2020-01-22

## 2020-01-22 DIAGNOSIS — R79.0 LOW IRON STORES: ICD-10-CM

## 2020-01-22 RX ORDER — FERROUS SULFATE 325(65) MG
325 TABLET ORAL DAILY
Qty: 100 TAB | Refills: 0 | Status: SHIPPED | OUTPATIENT
Start: 2020-01-22 | End: 2020-01-22 | Stop reason: SDUPTHER

## 2020-01-22 RX ORDER — FERROUS SULFATE 325(65) MG
325 TABLET ORAL DAILY
Qty: 100 TAB | Refills: 0 | Status: SHIPPED | OUTPATIENT
Start: 2020-01-22 | End: 2022-05-20

## 2020-01-22 NOTE — TELEPHONE ENCOUNTER
From: Fuentes Shawtristin Hawk  To: PROMISE Figueroa  Sent: 1/22/2020 10:55 AM PST  Subject: Non-Urgent Medical Question    Thank you so much! Can you please send it in to [facility] in [place]? [facility] no longer takes [facility].     ----- Message -----  From: PROMISE Figueroa  Sent: 1/22/20, 10:44 AM  To: Fuentes Zamoradonado  Subject: RE: Non-Urgent Medical Question    Hi EJ -     Based on your current iron level you should do well with a once daily supplement, I have sent this in.     IVETH HullC  Family Medicine     Covering for Lorri Wood M.D.       ----- Message -----   From: Fuentes Patel Kenn   Sent: 1/20/2020 7:33 PM PST   To: Lorri Wood M.D.  Subject: Non-Urgent Medical Question    Thanks so much for the reply. I do take [place] Light Men's One, turmeric, probiotic, and Vit C. Is it possible to get a script for the iron supplement, since it is cheaper than buying it over the counter? I'm just making sure that I do what I need for my body.     Just curious about my other levels (RBC, WBC, platelets, etc.). Are these no cause for concern at this time.     Thanks in advance!  FUENTES

## 2020-03-02 ENCOUNTER — PATIENT MESSAGE (OUTPATIENT)
Dept: MEDICAL GROUP | Facility: PHYSICIAN GROUP | Age: 45
End: 2020-03-02

## 2020-03-02 DIAGNOSIS — R79.0 LOW IRON STORES: ICD-10-CM

## 2020-03-02 DIAGNOSIS — D69.6 THROMBOCYTOPENIA (HCC): ICD-10-CM

## 2020-03-03 NOTE — TELEPHONE ENCOUNTER
From: Fuentes Hawk  To: Lorri Wood M.D.  Sent: 3/2/2020 8:18 PM PST  Subject: Non-Urgent Medical Question    Hi Dr. Wood,    When should I get bloodwork done again to see my levels (WBC, RBC, platelets, and ferritin). Thanks so much!  EJ

## 2020-08-03 ENCOUNTER — OFFICE VISIT (OUTPATIENT)
Dept: URGENT CARE | Facility: CLINIC | Age: 45
End: 2020-08-03
Payer: COMMERCIAL

## 2020-08-03 ENCOUNTER — APPOINTMENT (OUTPATIENT)
Dept: RADIOLOGY | Facility: IMAGING CENTER | Age: 45
End: 2020-08-03
Attending: FAMILY MEDICINE
Payer: COMMERCIAL

## 2020-08-03 VITALS
TEMPERATURE: 98.7 F | HEART RATE: 42 BPM | SYSTOLIC BLOOD PRESSURE: 114 MMHG | HEIGHT: 73 IN | RESPIRATION RATE: 16 BRPM | DIASTOLIC BLOOD PRESSURE: 76 MMHG | WEIGHT: 177 LBS | BODY MASS INDEX: 23.46 KG/M2 | OXYGEN SATURATION: 98 %

## 2020-08-03 DIAGNOSIS — M79.671 RIGHT FOOT PAIN: ICD-10-CM

## 2020-08-03 PROCEDURE — 73630 X-RAY EXAM OF FOOT: CPT | Mod: TC,RT | Performed by: FAMILY MEDICINE

## 2020-08-03 PROCEDURE — 99214 OFFICE O/P EST MOD 30 MIN: CPT | Performed by: FAMILY MEDICINE

## 2020-08-03 RX ORDER — LAMOTRIGINE 100 MG/1
100 TABLET ORAL DAILY
COMMUNITY
End: 2022-03-25

## 2020-08-03 RX ORDER — LITHIUM CARBONATE 300 MG
300 TABLET ORAL 3 TIMES DAILY
COMMUNITY
End: 2021-04-06

## 2020-08-03 ASSESSMENT — ENCOUNTER SYMPTOMS
FOCAL WEAKNESS: 0
FEVER: 0
SHORTNESS OF BREATH: 0
VOMITING: 0

## 2020-08-03 ASSESSMENT — FIBROSIS 4 INDEX: FIB4 SCORE: 1.43

## 2020-08-03 NOTE — PROGRESS NOTES
"Subjective:     Fuentes Hawk is a 45 y.o. male who presents for Foot Pain (right foot pain,hurts to bend around on the 1st and 2nd toe part, it was swollen yesterday, running a lot, pain started a week ago.)    HPI  Pt presents for evaluation of a new problem   Pt with right foot injury   Pain started about a week ago   Pain is more in the midfoot and into first 2 toes   Pain initially started while running, however now painful when walking  Pain is improved when resting  No swelling or skin changes but he has noticed    Review of Systems   Constitutional: Negative for fever.   Respiratory: Negative for shortness of breath.    Cardiovascular: Negative for chest pain.   Gastrointestinal: Negative for vomiting.   Skin: Negative for rash.   Neurological: Negative for focal weakness.     PMH:  has a past medical history of Psychiatric problem and Sleep apnea.  MEDS:   Current Outpatient Medications:   •  lamoTRIgine (LAMICTAL) 100 MG Tab, Take 100 mg by mouth every day., Disp: , Rfl:   •  lithium (ESKALITH) 300 MG Tab, Take 300 mg by mouth 3 times a day., Disp: , Rfl:   •  ferrous sulfate 325 (65 Fe) MG tablet, Take 1 Tab by mouth every day., Disp: 100 Tab, Rfl: 0  ALLERGIES:   Allergies   Allergen Reactions   • Ciprofloxacin Rash     SURGHX:   Past Surgical History:   Procedure Laterality Date   • KNEE ARTHROSCOPY  11/4/2013    Performed by Ammon Quezada III, M.D. at [facility]   • DENTAL EXTRACTION(S)     • NASAL FRACTURE REDUCTION CLOSED       SOCHX:  reports that he quit smoking about 11 years ago. His smoking use included cigarettes. He has a 30.00 pack-year smoking history. He has never used smokeless tobacco. He reports that he does not drink alcohol or use drugs.  FH: Family history was reviewed, not contributing to acute injury     Objective:   /76   Pulse (!) 42   Temp 37.1 °C (98.7 °F)   Resp 16   Ht 1.854 m (6' 1\")   Wt 80.3 kg (177 lb)   SpO2 98%   BMI 23.35 kg/m² "     Physical Exam  Constitutional:       General: He is not in acute distress.     Appearance: He is well-developed. He is not diaphoretic.   HENT:      Head: Normocephalic and atraumatic.   Musculoskeletal:      Comments: Right foot:  Appearance - No bruising, erythema, or deformity appreciated  Palpation - +TTP along 1st metatarsal and maximally along the plantar aspect  ROM - FROM throughout, minimal pain with resisted flexion and extension of first toe  Strength - 5/5 throughout  Neurovascular - 2+ dorsalis pedis and posterior tibial.  Sensation intact and equal bilaterally   Skin:     General: Skin is warm and dry.      Findings: No rash.   Neurological:      Mental Status: He is alert and oriented to person, place, and time.   Psychiatric:         Behavior: Behavior normal.         Thought Content: Thought content normal.         Judgment: Judgment normal.       Assessment/Plan:   Assessment    1. Right foot pain  - DX-FOOT-COMPLETE 3+ RIGHT; Future    Patient with right foot pain for the past 1 week.  On exam, has bony tenderness on his first metatarsal.  Concerned that this could possibly represent a bone stress injury.  X-ray does not show acute fracture or abnormality.  Patient will be placed in sling walking boot temporarily and plan to follow-up with his PCP in approximately 2 weeks.  If his pain is greatly improving by then, he may wean out of the boot and begin progressing towards running again.  If his pain is not improving by then, would recommend MRI to further evaluate for bone stress injury.

## 2021-04-06 ENCOUNTER — TELEMEDICINE (OUTPATIENT)
Dept: MEDICAL GROUP | Facility: PHYSICIAN GROUP | Age: 46
End: 2021-04-06
Payer: COMMERCIAL

## 2021-04-06 VITALS — HEIGHT: 73 IN | WEIGHT: 170 LBS | BODY MASS INDEX: 22.53 KG/M2

## 2021-04-06 DIAGNOSIS — F31.9 BIPOLAR 1 DISORDER (HCC): ICD-10-CM

## 2021-04-06 DIAGNOSIS — Z00.00 ANNUAL PHYSICAL EXAM: ICD-10-CM

## 2021-04-06 PROCEDURE — 99396 PREV VISIT EST AGE 40-64: CPT | Mod: 95,CR | Performed by: NURSE PRACTITIONER

## 2021-04-06 RX ORDER — LAMOTRIGINE 200 MG/1
TABLET ORAL
COMMUNITY
Start: 2021-04-03 | End: 2021-04-06

## 2021-04-06 RX ORDER — LITHIUM CARBONATE 450 MG
450 TABLET, EXTENDED RELEASE ORAL 2 TIMES DAILY
COMMUNITY
End: 2022-03-25 | Stop reason: SDUPTHER

## 2021-04-06 ASSESSMENT — FIBROSIS 4 INDEX: FIB4 SCORE: 1.46

## 2021-04-06 NOTE — ASSESSMENT & PLAN NOTE
Chronic problem.  Patient has been monitored by psychiatry, Dr. Mitchell.  Currently on lithium and Lamictal.  Patient is here for his annual requiring lab orders today.

## 2021-04-07 NOTE — PROGRESS NOTES
"Telemedicine Visit: Established Patient     This encounter was conducted via Zoom.   Verbal consent was obtained. Patient's identity was verified.    Subjective:     Chief Complaint   Patient presents with   • Annual Exam     Fuentes Jorge Hawk is a 46 y.o. male presenting for evaluation and management of following problems:    Bipolar 1 disorder (HCC)  Chronic problem.  Patient has been monitored by psychiatry, Dr. Mitchell.  Currently on lithium and Lamictal.  Patient is here for his annual requiring lab orders today.      ROS   Denies any recent fevers or chills. No nausea or vomiting. No chest pains or shortness of breath.     Allergies   Allergen Reactions   • Ciprofloxacin Rash       Current medicines (including changes today)  Current Outpatient Medications   Medication Sig Dispense Refill   • lithium ER (ESKALITH) 450 MG Tab CR tablet Take 450 mg by mouth 2 times a day.     • lamoTRIgine (LAMICTAL) 100 MG Tab Take 100 mg by mouth every day.     • ferrous sulfate 325 (65 Fe) MG tablet Take 1 Tab by mouth every day. 100 Tab 0     No current facility-administered medications for this visit.       Patient Active Problem List    Diagnosis Date Noted   • Wellness examination 11/26/2019   • Left leg pain 10/08/2019   • Mixed hyperlipidemia 01/22/2019   • Bipolar 1 disorder (HCC) 01/08/2019   • Prostatitis 01/08/2019       Family History   Problem Relation Age of Onset   • Heart Attack Father 38   • Heart Disease Father    • Hyperlipidemia Father    • Hypertension Father    • Breast Cancer Maternal Grandmother    • Diabetes Paternal Grandfather        He  has a past medical history of Psychiatric problem and Sleep apnea.  He  has a past surgical history that includes nasal fracture reduction closed; knee arthroscopy (11/4/2013); and dental extraction(s).       Objective:   Vitals obtained by patient:  Ht 1.854 m (6' 1\")   Wt 77.1 kg (170 lb)   BMI 22.43 kg/m²      Physical Exam:  General: No acute distress. Well " nourished.   HEENT: EOM grossly intact, no scleral icterus, no pharyngeal erythema.   Neck:  No JVD noted at 90 degrees, trachea midline  CVS: Pulse as reported by patient, no visible LE edema.  Resp: Unlabored respiratory effort, no cough or audible wheeze  MSK/Ext: No clubbing or cyanosis visible appreciated.  Skin: No rashes in visible areas.  Neurological: AOx3. CN III-XII grossly intact. No focal deficits.     LABS: 2019  results reviewed and discussed with the patient, questions answered.    Assessment and Plan:   1. Annual physical exam  - CBC WITHOUT DIFFERENTIAL; Future  - Comp Metabolic Panel; Future  - TSH; Future  - FREE THYROXINE; Future  - Lipid Profile; Future  - VITAMIN D,25 HYDROXY; Future  - PROSTATE SPECIFIC AG SCREENING; Future  - IRON/TOTAL IRON BIND; Future  - FERRITIN; Future  - VITAMIN B12; Future  - FOLATE; Future  - LITHIUM; Future    2. Bipolar 1 disorder (HCC)  Stable.  Followed by psychiatry, Dr. Mitchell       Follow-up: No follow-ups on file.

## 2021-07-23 ENCOUNTER — HOSPITAL ENCOUNTER (OUTPATIENT)
Dept: LAB | Facility: MEDICAL CENTER | Age: 46
End: 2021-07-23
Attending: NURSE PRACTITIONER
Payer: COMMERCIAL

## 2021-07-23 DIAGNOSIS — Z00.00 ANNUAL PHYSICAL EXAM: ICD-10-CM

## 2021-07-23 LAB
25(OH)D3 SERPL-MCNC: 33 NG/ML (ref 30–100)
ALBUMIN SERPL BCP-MCNC: 4.7 G/DL (ref 3.2–4.9)
ALBUMIN/GLOB SERPL: 2 G/DL
ALP SERPL-CCNC: 43 U/L (ref 30–99)
ALT SERPL-CCNC: 14 U/L (ref 2–50)
ANION GAP SERPL CALC-SCNC: 10 MMOL/L (ref 7–16)
AST SERPL-CCNC: 27 U/L (ref 12–45)
BILIRUB SERPL-MCNC: 0.8 MG/DL (ref 0.1–1.5)
BUN SERPL-MCNC: 18 MG/DL (ref 8–22)
CALCIUM SERPL-MCNC: 9.8 MG/DL (ref 8.5–10.5)
CHLORIDE SERPL-SCNC: 104 MMOL/L (ref 96–112)
CHOLEST SERPL-MCNC: 200 MG/DL (ref 100–199)
CO2 SERPL-SCNC: 25 MMOL/L (ref 20–33)
CREAT SERPL-MCNC: 0.93 MG/DL (ref 0.5–1.4)
ERYTHROCYTE [DISTWIDTH] IN BLOOD BY AUTOMATED COUNT: 48.8 FL (ref 35.9–50)
FASTING STATUS PATIENT QL REPORTED: NORMAL
FERRITIN SERPL-MCNC: 130 NG/ML (ref 22–322)
FOLATE SERPL-MCNC: 12.6 NG/ML
GLOBULIN SER CALC-MCNC: 2.4 G/DL (ref 1.9–3.5)
GLUCOSE SERPL-MCNC: 77 MG/DL (ref 65–99)
HCT VFR BLD AUTO: 45.2 % (ref 42–52)
HDLC SERPL-MCNC: 122 MG/DL
HGB BLD-MCNC: 14.6 G/DL (ref 14–18)
IRON SATN MFR SERPL: 45 % (ref 15–55)
IRON SERPL-MCNC: 138 UG/DL (ref 50–180)
LDLC SERPL CALC-MCNC: 72 MG/DL
LITHIUM SERPL-MCNC: 0.6 MMOL/L (ref 0.6–1.2)
MCH RBC QN AUTO: 32.9 PG (ref 27–33)
MCHC RBC AUTO-ENTMCNC: 32.3 G/DL (ref 33.7–35.3)
MCV RBC AUTO: 101.8 FL (ref 81.4–97.8)
PLATELET # BLD AUTO: 172 K/UL (ref 164–446)
PMV BLD AUTO: 10.3 FL (ref 9–12.9)
POTASSIUM SERPL-SCNC: 4.6 MMOL/L (ref 3.6–5.5)
PROT SERPL-MCNC: 7.1 G/DL (ref 6–8.2)
PSA SERPL-MCNC: 0.39 NG/ML (ref 0–4)
RBC # BLD AUTO: 4.44 M/UL (ref 4.7–6.1)
SODIUM SERPL-SCNC: 139 MMOL/L (ref 135–145)
T4 FREE SERPL-MCNC: 1.1 NG/DL (ref 0.93–1.7)
TIBC SERPL-MCNC: 304 UG/DL (ref 250–450)
TRIGL SERPL-MCNC: 32 MG/DL (ref 0–149)
TSH SERPL DL<=0.005 MIU/L-ACNC: 1.59 UIU/ML (ref 0.38–5.33)
UIBC SERPL-MCNC: 166 UG/DL (ref 110–370)
VIT B12 SERPL-MCNC: 543 PG/ML (ref 211–911)
WBC # BLD AUTO: 4.4 K/UL (ref 4.8–10.8)

## 2021-07-23 PROCEDURE — 80178 ASSAY OF LITHIUM: CPT

## 2021-07-23 PROCEDURE — 80053 COMPREHEN METABOLIC PANEL: CPT

## 2021-07-23 PROCEDURE — 82607 VITAMIN B-12: CPT

## 2021-07-23 PROCEDURE — 83550 IRON BINDING TEST: CPT

## 2021-07-23 PROCEDURE — 80061 LIPID PANEL: CPT

## 2021-07-23 PROCEDURE — 84153 ASSAY OF PSA TOTAL: CPT

## 2021-07-23 PROCEDURE — 82728 ASSAY OF FERRITIN: CPT

## 2021-07-23 PROCEDURE — 82306 VITAMIN D 25 HYDROXY: CPT

## 2021-07-23 PROCEDURE — 83540 ASSAY OF IRON: CPT

## 2021-07-23 PROCEDURE — 82746 ASSAY OF FOLIC ACID SERUM: CPT

## 2021-07-23 PROCEDURE — 85027 COMPLETE CBC AUTOMATED: CPT

## 2021-07-23 PROCEDURE — 84443 ASSAY THYROID STIM HORMONE: CPT

## 2021-07-23 PROCEDURE — 84439 ASSAY OF FREE THYROXINE: CPT

## 2021-07-23 PROCEDURE — 36415 COLL VENOUS BLD VENIPUNCTURE: CPT

## 2022-03-25 ENCOUNTER — TELEMEDICINE (OUTPATIENT)
Dept: MEDICAL GROUP | Facility: PHYSICIAN GROUP | Age: 47
End: 2022-03-25
Payer: COMMERCIAL

## 2022-03-25 VITALS — BODY MASS INDEX: 22.53 KG/M2 | WEIGHT: 170 LBS | OXYGEN SATURATION: 96 % | HEIGHT: 73 IN | HEART RATE: 42 BPM

## 2022-03-25 DIAGNOSIS — F31.9 BIPOLAR 1 DISORDER (HCC): ICD-10-CM

## 2022-03-25 DIAGNOSIS — Z00.00 ANNUAL PHYSICAL EXAM: ICD-10-CM

## 2022-03-25 PROCEDURE — 99214 OFFICE O/P EST MOD 30 MIN: CPT | Mod: 95 | Performed by: NURSE PRACTITIONER

## 2022-03-25 RX ORDER — LAMOTRIGINE 200 MG/1
200 TABLET ORAL 2 TIMES DAILY
Qty: 180 TABLET | Refills: 3 | Status: SHIPPED | OUTPATIENT
Start: 2022-03-25 | End: 2022-05-20 | Stop reason: SDUPTHER

## 2022-03-25 RX ORDER — AMOXICILLIN 500 MG/1
CAPSULE ORAL
COMMUNITY
Start: 2022-01-04 | End: 2022-03-25

## 2022-03-25 RX ORDER — LITHIUM CARBONATE 450 MG
450 TABLET, EXTENDED RELEASE ORAL 2 TIMES DAILY
Qty: 60 TABLET | Refills: 0 | Status: SHIPPED | OUTPATIENT
Start: 2022-03-25 | End: 2022-04-21 | Stop reason: SDUPTHER

## 2022-03-25 ASSESSMENT — FIBROSIS 4 INDEX: FIB4 SCORE: 2.01

## 2022-03-25 NOTE — PROGRESS NOTES
Telemedicine Visit: Established Patient     This encounter was conducted via Zoom.   Verbal consent was obtained. Patient's identity was verified.    Subjective:     Chief Complaint   Patient presents with   • Requesting Labs   • Referral Needed     Referral for psychiatrist      Fuentes Jorge Hawk is a 47 y.o. male presenting for evaluation and management of following problems:    Bipolar 1 disorder (HCC)  Chronic and stable.  Currently on lamotrigine 200 mg twice daily and lithium  mg twice daily.  Denies manic episodes, sleeps well.  Labs from July 2021 WNL.  Having trouble communicating.  Psychiatry is unable to get refills and recheck his provider.  Requesting referral to psychiatry locally and also needing refills.   Ref. Range 7/23/2021 08:16   Lithium Latest Ref Range: 0.6 - 1.2 mmol/L 0.6   25-Hydroxy   Vitamin D 25 Latest Ref Range: 30 - 100 ng/mL 33   Ferritin Latest Ref Range: 22.0 - 322.0 ng/mL 130.0   Folate -Folic Acid Latest Ref Range: >4.0 ng/mL 12.6   Prostatic Specific Antigen Tot Latest Ref Range: 0.00 - 4.00 ng/mL 0.39   Vitamin B12 -True Cobalamin Latest Ref Range: 211 - 911 pg/mL 543   TSH Latest Ref Range: 0.380 - 5.330 uIU/mL 1.590   Free T-4 Latest Ref Range: 0.93 - 1.70 ng/dL 1.10       ROS   Denies any recent fevers or chills. No nausea or vomiting. No chest pains or shortness of breath.     Allergies   Allergen Reactions   • Ciprofloxacin Rash   • Ciprofibrate        Current medicines (including changes today)  Current Outpatient Medications   Medication Sig Dispense Refill   • lamotrigine (LAMICTAL) 200 MG tablet Take 1 Tablet by mouth 2 times a day. 180 Tablet 3   • lithium carbonate  MG Tab CR tablet Take 1 Tablet by mouth 2 times a day. 60 Tablet 0   • ferrous sulfate 325 (65 Fe) MG tablet Take 1 Tab by mouth every day. 100 Tab 0     No current facility-administered medications for this visit.       Patient Active Problem List    Diagnosis Date Noted   • Wellness  "examination 11/26/2019   • Left leg pain 10/08/2019   • Mixed hyperlipidemia 01/22/2019   • Bipolar 1 disorder (HCC) 01/08/2019   • Prostatitis 01/08/2019       Family History   Problem Relation Age of Onset   • Heart Attack Father 38   • Heart Disease Father    • Hyperlipidemia Father    • Hypertension Father    • Breast Cancer Maternal Grandmother    • Diabetes Paternal Grandfather        He  has a past medical history of Bipolar 1 disorder (HCC), Psychiatric problem, Sleep apnea, and Substance abuse (HCC).  He  has a past surgical history that includes nasal fracture reduction closed; knee arthroscopy (11/4/2013); dental extraction(s); and meniscus repair (2013).       Objective:   Vitals obtained by patient:  Pulse (!) 42 Comment: per pt watch  Ht 1.854 m (6' 1\")   Wt 77.1 kg (170 lb)   SpO2 96%   BMI 22.43 kg/m²      Physical Exam:  General: No acute distress. Well nourished.   HEENT: EOM grossly intact, no scleral icterus, no pharyngeal erythema.   Neck:  No JVD noted at 90 degrees, trachea midline  CVS: Pulse as reported by patient, no visible LE edema.  Resp: Unlabored respiratory effort, no cough or audible wheeze  MSK/Ext: No clubbing or cyanosis visible appreciated.  Skin: No rashes in visible areas.  Neurological: AOx3. CN III-XII grossly intact. No focal deficits.     LABS: 7/2021  results reviewed and discussed with the patient, questions answered.      Assessment and Plan:   1. Bipolar 1 disorder (HCC)  Stable on current regimen.  Continue.  Refills given  - LITHIUM; Future  - Referral to Psychiatry  - lamotrigine (LAMICTAL) 200 MG tablet; Take 1 Tablet by mouth 2 times a day.  Dispense: 180 Tablet; Refill: 3  - lithium carbonate  MG Tab CR tablet; Take 1 Tablet by mouth 2 times a day.  Dispense: 60 Tablet; Refill: 0    2. Annual physical exam  - Comp Metabolic Panel; Future  - CBC WITHOUT DIFFERENTIAL; Future  - TSH; Future  - VITAMIN D,25 HYDROXY; Future  - Lipid Profile; Future  - " HOMOCYSTINE URINE  - VITAMIN B12; Future  - FERRITIN; Future  - FOLATE; Future       Follow-up: No follow-ups on file.

## 2022-03-25 NOTE — ASSESSMENT & PLAN NOTE
Chronic and stable.  Currently on lamotrigine 200 mg twice daily and lithium  mg twice daily.  Denies manic episodes, sleeps well.  Labs from July 2021 WNL.  Having trouble communicating.  Psychiatry is unable to get refills and recheck his provider.  Requesting referral to psychiatry locally and also needing refills.   Ref. Range 7/23/2021 08:16   Lithium Latest Ref Range: 0.6 - 1.2 mmol/L 0.6   25-Hydroxy   Vitamin D 25 Latest Ref Range: 30 - 100 ng/mL 33   Ferritin Latest Ref Range: 22.0 - 322.0 ng/mL 130.0   Folate -Folic Acid Latest Ref Range: >4.0 ng/mL 12.6   Prostatic Specific Antigen Tot Latest Ref Range: 0.00 - 4.00 ng/mL 0.39   Vitamin B12 -True Cobalamin Latest Ref Range: 211 - 911 pg/mL 543   TSH Latest Ref Range: 0.380 - 5.330 uIU/mL 1.590   Free T-4 Latest Ref Range: 0.93 - 1.70 ng/dL 1.10

## 2022-05-20 ENCOUNTER — TELEMEDICINE (OUTPATIENT)
Dept: MEDICAL GROUP | Facility: PHYSICIAN GROUP | Age: 47
End: 2022-05-20
Payer: COMMERCIAL

## 2022-05-20 VITALS — HEIGHT: 73 IN | BODY MASS INDEX: 23.59 KG/M2 | WEIGHT: 178 LBS

## 2022-05-20 DIAGNOSIS — E55.9 VITAMIN D DEFICIENCY: ICD-10-CM

## 2022-05-20 DIAGNOSIS — R14.0 ABDOMINAL BLOATING: ICD-10-CM

## 2022-05-20 DIAGNOSIS — F31.9 BIPOLAR 1 DISORDER (HCC): ICD-10-CM

## 2022-05-20 DIAGNOSIS — E78.2 MIXED HYPERLIPIDEMIA: ICD-10-CM

## 2022-05-20 PROCEDURE — 99214 OFFICE O/P EST MOD 30 MIN: CPT | Mod: 95 | Performed by: NURSE PRACTITIONER

## 2022-05-20 RX ORDER — ERGOCALCIFEROL 1.25 MG/1
50000 CAPSULE ORAL
Qty: 12 CAPSULE | Refills: 3 | Status: SHIPPED
Start: 2022-05-20 | End: 2023-02-16

## 2022-05-20 RX ORDER — LITHIUM CARBONATE 450 MG
450 TABLET, EXTENDED RELEASE ORAL 2 TIMES DAILY
Qty: 60 TABLET | Refills: 0 | Status: SHIPPED | OUTPATIENT
Start: 2022-05-20 | End: 2022-06-20 | Stop reason: SDUPTHER

## 2022-05-20 RX ORDER — LAMOTRIGINE 200 MG/1
200 TABLET ORAL 2 TIMES DAILY
Qty: 180 TABLET | Refills: 3 | Status: SHIPPED | OUTPATIENT
Start: 2022-05-20 | End: 2022-08-19 | Stop reason: SDUPTHER

## 2022-05-20 ASSESSMENT — FIBROSIS 4 INDEX: FIB4 SCORE: 2.01

## 2022-05-20 NOTE — PROGRESS NOTES
Telemedicine Visit: Established Patient     This encounter was conducted via Zoom.   Verbal consent was obtained. Patient's identity was verified.    Subjective:     Chief Complaint   Patient presents with   • Other     Lab results medication refill and elimination diet      Fuentes Jorge Hawk is a 47 y.o. male presenting for evaluation and management of following problems:    Bipolar 1 disorder (HCC)  Chronic and stable.  On lithium 450 mg twice daily and lamotrigine 200 mg twice daily.   Lithium levels are at 0.6 from labs 5/10/2022  Needing refills today, pending referral to psychiatry.    Vitamin D deficiency  Vit d 23.9 labs from 5/10/22    Mixed hyperlipidemia  Labs from 5/10/2022 with total cholesterol 226, triglycerides at 40,  and .  Leads a healthy lifestyle, non-smoker, physically active and following a healthy diet.    Abdominal bloating  New problem.  Chronic intermittent abdominal bloating, try to limit gluten without symptoms improving.  Consuming cheese and not avoiding sugars.  Would like to discuss lamination diet.  No diagnosed history of celiac.  No nausea, diarrhea constipation.      ROS   Denies any recent fevers or chills. No nausea or vomiting. No chest pains or shortness of breath.     Allergies   Allergen Reactions   • Ciprofloxacin Rash   • Ciprofibrate        Current medicines (including changes today)  Current Outpatient Medications   Medication Sig Dispense Refill   • lamotrigine (LAMICTAL) 200 MG tablet Take 1 Tablet by mouth 2 times a day. 180 Tablet 3   • lithium carbonate  MG Tab CR tablet Take 1 Tablet by mouth 2 times a day. 60 Tablet 0   • vitamin D2, Ergocalciferol, (DRISDOL) 1.25 MG (57399 UT) Cap capsule Take 1 Capsule by mouth every 7 days. 12 Capsule 3     No current facility-administered medications for this visit.       Patient Active Problem List    Diagnosis Date Noted   • Vitamin D deficiency 05/20/2022   • Abdominal bloating 05/20/2022   •  "Wellness examination 11/26/2019   • Left leg pain 10/08/2019   • Mixed hyperlipidemia 01/22/2019   • Bipolar 1 disorder (HCC) 01/08/2019   • Prostatitis 01/08/2019       Family History   Problem Relation Age of Onset   • Heart Attack Father 38   • Heart Disease Father    • Hyperlipidemia Father    • Hypertension Father    • Breast Cancer Maternal Grandmother    • Diabetes Paternal Grandfather        He  has a past medical history of Bipolar 1 disorder (HCC), Psychiatric problem, Sleep apnea, and Substance abuse (HCC).  He  has a past surgical history that includes nasal fracture reduction closed; knee arthroscopy (11/4/2013); dental extraction(s); and meniscus repair (2013).       Objective:   Vitals obtained by patient:  Ht 1.854 m (6' 1\")   Wt 80.7 kg (178 lb)   BMI 23.48 kg/m²      Physical Exam:  General: No acute distress. Well nourished.   HEENT: EOM grossly intact, no scleral icterus, no pharyngeal erythema.   Neck:  No JVD noted at 90 degrees, trachea midline  CVS: Pulse as reported by patient, no visible LE edema.  Resp: Unlabored respiratory effort, no cough or audible wheeze  MSK/Ext: No clubbing or cyanosis visible appreciated.  Skin: No rashes in visible areas.  Neurological: AOx3. CN III-XII grossly intact. No focal deficits.     LABS: 5/2022  results reviewed and discussed with the patient, questions answered.      Assessment and Plan:   1. Bipolar 1 disorder (HCC)  Referral to psychiatry is closed.  We will place a referral today.  Labs are stable, refills are given.  - Referral to Psychiatry  - lamotrigine (LAMICTAL) 200 MG tablet; Take 1 Tablet by mouth 2 times a day.  Dispense: 180 Tablet; Refill: 3  - lithium carbonate  MG Tab CR tablet; Take 1 Tablet by mouth 2 times a day.  Dispense: 60 Tablet; Refill: 0    2. Vitamin D deficiency  - vitamin D2, Ergocalciferol, (DRISDOL) 1.25 MG (73087 UT) Cap capsule; Take 1 Capsule by mouth every 7 days.  Dispense: 12 Capsule; Refill: 3    3. Mixed " hyperlipidemia  Well-controlled on lifestyle modifications.  Continue    4. Abdominal bloating  Discussed importance of elimination diet, start with gluten, sugary foods, dairy, may consider aches and cruciferous vegetables.  Trial of whole 30 diet.  We will test for celiac.  - CELIAC DISEASE AB PANEL; Future       Follow-up: No follow-ups on file.

## 2022-05-20 NOTE — ASSESSMENT & PLAN NOTE
Chronic and stable.  On lithium 450 mg twice daily and lamotrigine 200 mg twice daily.  He is feeling good and stable, no manic episodes.  Lithium levels are at 0.6 from labs 5/10/2022  Needing refills today, pending referral to psychiatry.

## 2022-05-20 NOTE — ASSESSMENT & PLAN NOTE
Labs from 5/10/2022 with total cholesterol 226, triglycerides at 40,  and .  Leads a healthy lifestyle, non-smoker, physically active and following a healthy diet.

## 2022-05-20 NOTE — ASSESSMENT & PLAN NOTE
New problem.  Chronic intermittent abdominal bloating, try to limit gluten without symptoms improving.  Consuming cheese and not avoiding sugars.  Would like to discuss lamination diet.  No diagnosed history of celiac.  No nausea, diarrhea constipation.

## 2022-06-23 ENCOUNTER — TELEMEDICINE (OUTPATIENT)
Dept: MEDICAL GROUP | Facility: PHYSICIAN GROUP | Age: 47
End: 2022-06-23
Payer: COMMERCIAL

## 2022-06-23 VITALS — HEIGHT: 73 IN | BODY MASS INDEX: 22.26 KG/M2 | WEIGHT: 168 LBS

## 2022-06-23 DIAGNOSIS — F31.9 BIPOLAR 1 DISORDER (HCC): ICD-10-CM

## 2022-06-23 PROCEDURE — 99213 OFFICE O/P EST LOW 20 MIN: CPT | Mod: 95 | Performed by: NURSE PRACTITIONER

## 2022-06-23 RX ORDER — LITHIUM CARBONATE 450 MG
450 TABLET, EXTENDED RELEASE ORAL 2 TIMES DAILY
Qty: 120 TABLET | Refills: 0 | Status: SHIPPED | OUTPATIENT
Start: 2022-06-23 | End: 2022-08-19 | Stop reason: SDUPTHER

## 2022-06-23 ASSESSMENT — FIBROSIS 4 INDEX: FIB4 SCORE: 2.01

## 2022-06-23 NOTE — ASSESSMENT & PLAN NOTE
Chronic and stable.  On lithium 450 mg twice daily and lamotrigine 200 mg twice daily.  Otherwise referral to psychiatry.  Patient to call and schedule with Dr. Torres at [facility].  No change in lithium dose for years.  The most recent lithium serum 0.6, low side of normal.  No manic episodes doing really well.  Patient has tried the whole 30 diet, feeling great, optimal allergy.  No anxiety, depression or SI.

## 2022-06-23 NOTE — PROGRESS NOTES
Telemedicine Visit: Established Patient     This encounter was conducted via Zoom.   Verbal consent was obtained. Patient's identity was verified.    Subjective:     Chief Complaint   Patient presents with   • Medication Refill   • Follow-Up     Fuentes Hawk is a 47 y.o. male presenting for evaluation and management of following problems:    Bipolar 1 disorder (HCC)  Chronic and stable.  On lithium 450 mg twice daily and lamotrigine 200 mg twice daily.  Otherwise referral to psychiatry.  Patient to call and schedule with Dr. Torres at [facility].  No change in lithium dose for years.  The most recent lithium serum 0.6, low side of normal.  No manic episodes doing really well.  Patient has tried the whole 30 diet, feeling great, optimal allergy.  No anxiety, depression or SI.      ROS   Denies any recent fevers or chills. No nausea or vomiting. No chest pains or shortness of breath.     Allergies   Allergen Reactions   • Ciprofloxacin Rash     rash   • Other Drug Unspecified     Narcotics - patient has substance abuse issues and does not want to take narcotics   • Ciprofibrate        Current medicines (including changes today)  Current Outpatient Medications   Medication Sig Dispense Refill   • lithium carbonate  MG Tab CR tablet Take 1 Tablet by mouth 2 times a day. 120 Tablet 0   • lamotrigine (LAMICTAL) 200 MG tablet Take 1 Tablet by mouth 2 times a day. 180 Tablet 3   • vitamin D2, Ergocalciferol, (DRISDOL) 1.25 MG (41820 UT) Cap capsule Take 1 Capsule by mouth every 7 days. 12 Capsule 3     No current facility-administered medications for this visit.       Patient Active Problem List    Diagnosis Date Noted   • Vitamin D deficiency 05/20/2022   • Abdominal bloating 05/20/2022   • Wellness examination 11/26/2019   • Left leg pain 10/08/2019   • Mixed hyperlipidemia 01/22/2019   • Bipolar 1 disorder (HCC) 01/08/2019   • Prostatitis 01/08/2019       Family History   Problem Relation  "Age of Onset   • Heart Attack Father 38   • Heart Disease Father    • Hyperlipidemia Father    • Hypertension Father    • Breast Cancer Maternal Grandmother    • Diabetes Paternal Grandfather        He  has a past medical history of Bipolar 1 disorder (HCC), Psychiatric problem, Sleep apnea, and Substance abuse (HCC).    He has no past medical history of Anesthesia, Breath shortness, or Dental disorder.  He  has a past surgical history that includes nasal fracture reduction closed; knee arthroscopy (11/4/2013); dental extraction(s); and meniscus repair (2013).       Objective:   Vitals obtained by patient:  Ht 1.854 m (6' 1\")   Wt 76.2 kg (168 lb)   BMI 22.16 kg/m²      Physical Exam:  General: No acute distress. Well nourished.   HEENT: EOM grossly intact, no scleral icterus, no pharyngeal erythema.   Neck:  No JVD noted at 90 degrees, trachea midline  CVS: Pulse as reported by patient, no visible LE edema.  Resp: Unlabored respiratory effort, no cough or audible wheeze  MSK/Ext: No clubbing or cyanosis visible appreciated.  Skin: No rashes in visible areas.  Neurological: AOx3. CN III-XII grossly intact. No focal deficits.     LABS: 5/2022  results reviewed and discussed with the patient, questions answered.    Assessment and Plan:   1. Bipolar 1 disorder (HCC)  Well-controlled on current regimen.  Continue.  Refills given.  Patient will call and schedule with psychiatry.  - lithium carbonate  MG Tab CR tablet; Take 1 Tablet by mouth 2 times a day.  Dispense: 120 Tablet; Refill: 0       Follow-up: No follow-ups on file.          "

## 2022-08-19 ENCOUNTER — TELEMEDICINE (OUTPATIENT)
Dept: MEDICAL GROUP | Facility: PHYSICIAN GROUP | Age: 47
End: 2022-08-19
Payer: COMMERCIAL

## 2022-08-19 DIAGNOSIS — Z76.0 MEDICATION REFILL: ICD-10-CM

## 2022-08-19 DIAGNOSIS — F31.9 BIPOLAR 1 DISORDER (HCC): ICD-10-CM

## 2022-08-19 PROCEDURE — 99213 OFFICE O/P EST LOW 20 MIN: CPT | Mod: 95 | Performed by: NURSE PRACTITIONER

## 2022-08-19 RX ORDER — LITHIUM CARBONATE 450 MG
450 TABLET, EXTENDED RELEASE ORAL
Qty: 90 TABLET | Refills: 1 | Status: SHIPPED | OUTPATIENT
Start: 2022-08-19 | End: 2022-10-17 | Stop reason: SDUPTHER

## 2022-08-19 RX ORDER — LAMOTRIGINE 200 MG/1
200 TABLET ORAL 2 TIMES DAILY
Qty: 180 TABLET | Refills: 1 | Status: SHIPPED | OUTPATIENT
Start: 2022-08-19 | End: 2022-10-17 | Stop reason: SDUPTHER

## 2022-08-19 NOTE — PROGRESS NOTES
Virtual Visit: Established Patient   This visit was conducted via Zoom using secure and encrypted videoconferencing technology.   The patient was in their home in the state [facility].    The patient's identity was confirmed and verbal consent was obtained for this virtual visit.     Subjective:   CC:   Chief Complaint   Patient presents with    Medication Refill     Psychiatry Oct 17, lithium today and one more month of lamictal        Fuentes Hawk is a 47 y.o. male presenting for evaluation and management of:    ROS     Current medicines (including changes today)  Current Outpatient Medications   Medication Sig Dispense Refill    lamotrigine (LAMICTAL) 200 MG tablet Take 1 Tablet by mouth 2 times a day. 180 Tablet 1    lithium carbonate  MG Tab CR tablet Take 1 Tablet by mouth 3 times a day. 90 Tablet 1    vitamin D2, Ergocalciferol, (DRISDOL) 1.25 MG (50339 UT) Cap capsule Take 1 Capsule by mouth every 7 days. 12 Capsule 3     No current facility-administered medications for this visit.       Patient Active Problem List    Diagnosis Date Noted    Vitamin D deficiency 05/20/2022    Abdominal bloating 05/20/2022    Wellness examination 11/26/2019    Left leg pain 10/08/2019    Mixed hyperlipidemia 01/22/2019    Bipolar 1 disorder (HCC) 01/08/2019    Prostatitis 01/08/2019        Objective:   There were no vitals taken for this visit.    Physical Exam:  Constitutional: Alert, no distress, well-groomed.  Skin: No rashes in visible areas.  Eye: Round. Conjunctiva clear, lids normal. No icterus.   ENMT: Lips pink without lesions, good dentition, moist mucous membranes. Phonation normal.  Neck: No masses, no thyromegaly. Moves freely without pain.  Respiratory: Unlabored respiratory effort, no cough or audible wheeze  Psych: Alert and oriented x3, normal affect and mood.     Assessment and Plan:   The following treatment plan was discussed:   1. Bipolar 1 disorder (HCC)  - lamotrigine (LAMICTAL) 200 MG  tablet; Take 1 Tablet by mouth 2 times a day.  Dispense: 180 Tablet; Refill: 1  - lithium carbonate  MG Tab CR tablet; Take 1 Tablet by mouth 3 times a day.  Dispense: 90 Tablet; Refill: 1  - LITHIUM; Future    2. Medication refill       Follow-up: No follow-ups on file.4 wks

## 2022-08-19 NOTE — ASSESSMENT & PLAN NOTE
Patient is here for medication refill.  He is on lithium 450 mg twice daily and lamotrigine 200 mg twice daily.  Taking his medications as prescribed.  He is scheduled to see psychiatrist Dr. [facility] on October 17.  He is most recent lithium levels are 0.6.  He had a depressive episode in the last few weeks,  not debilitating.  Has been stable for a long time.

## 2022-08-25 DIAGNOSIS — Z01.818 PRE-OP EVALUATION: ICD-10-CM

## 2022-08-25 DIAGNOSIS — R00.1 SINUS BRADYCARDIA ON ECG: ICD-10-CM

## 2022-08-25 DIAGNOSIS — I51.7 LEFT VENTRICULAR HYPERTROPHY: ICD-10-CM

## 2022-08-25 DIAGNOSIS — I44.0 FIRST DEGREE AV BLOCK: ICD-10-CM

## 2022-08-25 DIAGNOSIS — R94.31 ABNORMAL EKG: ICD-10-CM

## 2022-08-26 NOTE — PROGRESS NOTES
EKG from 8/20/22 with cardiomyopathy, and his bradycardia at 36 with sinus arrhythmia with first-degree AV block.  Left ventricular hypertrophy and ST change.  He is on lithium (0.6 on 8/16/2022 )and lamotrigine.    He has a history of early CAD / CABG in his dad who had his first MI in his 30s, but no sudden or unexplained death.

## 2022-08-29 ENCOUNTER — TELEPHONE (OUTPATIENT)
Dept: MEDICAL GROUP | Facility: PHYSICIAN GROUP | Age: 47
End: 2022-08-29
Payer: COMMERCIAL

## 2022-08-29 NOTE — TELEPHONE ENCOUNTER
Dr. Oconnell, an anesthesiologist from [facility] called in regards to pt's EKG. She would like to speak to Sarahi directly about pt's EKG and getting an Echo scheduled for pt.   I see referral for Cardiology placed in pt's chart.   She would like a call back at [phone].

## 2022-08-31 ENCOUNTER — TELEPHONE (OUTPATIENT)
Dept: MEDICAL GROUP | Facility: PHYSICIAN GROUP | Age: 47
End: 2022-08-31
Payer: COMMERCIAL

## 2022-08-31 NOTE — TELEPHONE ENCOUNTER
----- Message from PROMISE Hawkins sent at 8/30/2022  8:01 AM PDT -----  Please contact pt by phone and let him know he needs to contact cardiology at [phone] and schedule an appt. He also needs to follow up with ECHO at [facility] that I ordered per Dr. Oconnell request ([place]). Thank you

## 2022-09-01 ENCOUNTER — TELEPHONE (OUTPATIENT)
Dept: CARDIOLOGY | Facility: MEDICAL CENTER | Age: 47
End: 2022-09-01

## 2022-09-01 ENCOUNTER — OFFICE VISIT (OUTPATIENT)
Dept: CARDIOLOGY | Facility: MEDICAL CENTER | Age: 47
End: 2022-09-01
Attending: NURSE PRACTITIONER
Payer: COMMERCIAL

## 2022-09-01 VITALS
WEIGHT: 173 LBS | HEIGHT: 73 IN | RESPIRATION RATE: 12 BRPM | SYSTOLIC BLOOD PRESSURE: 110 MMHG | HEART RATE: 49 BPM | BODY MASS INDEX: 22.93 KG/M2 | DIASTOLIC BLOOD PRESSURE: 64 MMHG | OXYGEN SATURATION: 96 %

## 2022-09-01 DIAGNOSIS — R00.1 SINUS BRADYCARDIA ON ECG: ICD-10-CM

## 2022-09-01 DIAGNOSIS — R79.89 HIGH SERUM HIGH DENSITY LIPOPROTEIN (HDL): ICD-10-CM

## 2022-09-01 DIAGNOSIS — Z01.810 PREOP CARDIOVASCULAR EXAM: Primary | ICD-10-CM

## 2022-09-01 DIAGNOSIS — R94.31 NONSPECIFIC ABNORMAL ELECTROCARDIOGRAM (ECG) (EKG): ICD-10-CM

## 2022-09-01 DIAGNOSIS — Z71.89 COUNSELING ON HEALTH PROMOTION AND DISEASE PREVENTION: ICD-10-CM

## 2022-09-01 DIAGNOSIS — E78.5 DYSLIPIDEMIA: ICD-10-CM

## 2022-09-01 DIAGNOSIS — Z82.49 FAMILY HISTORY OF PREMATURE CAD: ICD-10-CM

## 2022-09-01 LAB — EKG IMPRESSION: NORMAL

## 2022-09-01 PROCEDURE — 99204 OFFICE O/P NEW MOD 45 MIN: CPT | Performed by: INTERNAL MEDICINE

## 2022-09-01 PROCEDURE — 93000 ELECTROCARDIOGRAM COMPLETE: CPT | Performed by: INTERNAL MEDICINE

## 2022-09-01 ASSESSMENT — FIBROSIS 4 INDEX: FIB4 SCORE: 2.05

## 2022-09-01 NOTE — TELEPHONE ENCOUNTER
Faxed AL's note to Wesley Reese MD ortho   [address], [facility]. , [address]  [place], [place]  [phone]    Fax: [phone]    Confirmation fax received.

## 2022-09-01 NOTE — PROGRESS NOTES
CARDIOLOGY NEW PATIENT:    PCP: PROMISE Hawkins    1. Preop cardiovascular exam    2. Family history of premature CAD    3. Nonspecific abnormal electrocardiogram (ECG) (EKG)    4. Counseling on health promotion and disease prevention    5. Dyslipidemia    6. High serum high density lipoprotein (HDL)    7. Sinus bradycardia on ECG        Fuentes Hawk is referred for preoperative cardiovascular examination.    Chief Complaint   Patient presents with    Abnormal EKG     Np Dx: Abnormal EKG          History: Fuentes Hawk is a 47 y.o. male with history of arthritis, type I bipolar disorder on lithium and lamotrigine, DL (2021: , TG 32,  and LLD 72), prior smoking (quit 18 years ago), quit alcohol 2011, and family history of premature CAD in his father who had his first MI at 38 needing 2v CABG (twice) -( from gallbladder surgery complications) , is referred for preop cardiovascular evaluation before left knee surgery on 2020 given abnormal ECG.    Endurance athlete: running and cycling. 5-6 days a week, 150-180 miles a week. No CP, SOB, MULTANI. HR increases to 150's with cycling, 170's with running. Has not been running due to left knee pain. Diet consists of meats (chicken, fish, beef), gluten free, potatoes, vegetable, not much legumes. Weight stable 165-175lbs.    Normal lithium level 22. Resumed lithium for the past 3 years. On it since age 30. On lamotrigine since , resumed it 3 years. Did not notice higher HR off lithium / lamictal combination.     Normal TSH level 2021.    Healthy older sister. Lives with yeison and has 3 children.  (ANGELES, drug diversion), works from home 2 days/week.    No marijuana, no illicit drugs.      ECG 22: personally reviewed      Cardiovascular Risk Factors:  1. Smoking status: Quit 18 years ago  2. Type II Diabetes Mellitus: No  3. Hypertension: No  4. Dyslipidemia: Mild, but high HDL  5.  "Obesity / metabolic syndrome: No  6. Family history of ASCVD: Yes, father  7. Family history of premature ASCVD in a first degree relative (Male less than 55 years of age; Female less than 60 years of age): Yes  8. Sedentary lifestyle: No  9. Lack of exercise: No      PE:  /64 (BP Location: Left arm, Patient Position: Sitting, BP Cuff Size: Adult)   Pulse (!) 49   Resp 12   Ht 1.854 m (6' 1\")   Wt 78.5 kg (173 lb)   SpO2 96%   BMI 22.82 kg/m²     Gen: well  HEENT: Symmetric face. Anicteric sclerae. Moist mucus membranes  NECK: No JVD. No lymphadenopathy  CARDIAC: Regular, Normal S1, S2, +systolic murmur (soft holosystolic murmur and not worse with valsalva, change with respiration)  VASCULATURE: carotids are normal bilaterally without bruit  RESP: Clear to auscultation bilaterally  ABD: Soft, non-tender, non-distended  EXT: No edema, no clubbing or cyanosis  SKIN: Warm and dry  NEURO: No gross deficits  PSYCH: Appropriate affect, participates in conversation    The ASCVD Risk score (Cambridge JULIA Jr, et al., 2013) failed to calculate.    Past Medical History:   Diagnosis Date    Bipolar 1 disorder (HCC)     Psychiatric problem     HX OF BIPOLAR    Sleep apnea     PREVIOUS HX--NONE SINCE WT LOSS  LBS    Substance abuse (HCC)      Past Surgical History:   Procedure Laterality Date    KNEE ARTHROSCOPY  11/4/2013    Performed by Ammon Quezada III, M.D. at [facility]    MENISCUS REPAIR  2013    DENTAL EXTRACTION(S)      NASAL FRACTURE REDUCTION CLOSED       Allergies   Allergen Reactions    Ciprofloxacin Rash     rash    Other Drug Unspecified     Narcotics - patient has substance abuse issues and does not want to take narcotics    Ciprofibrate      Outpatient Encounter Medications as of 9/1/2022   Medication Sig Dispense Refill    lamotrigine (LAMICTAL) 200 MG tablet Take 1 Tablet by mouth 2 times a day. 180 Tablet 1    lithium carbonate  MG Tab CR tablet Take 1 Tablet by mouth 3 times a " day. 90 Tablet 1    vitamin D2, Ergocalciferol, (DRISDOL) 1.25 MG (85194 UT) Cap capsule Take 1 Capsule by mouth every 7 days. 12 Capsule 3     No facility-administered encounter medications on file as of 2022.     Social History     Socioeconomic History    Marital status: Single     Spouse name: Not on file    Number of children: Not on file    Years of education: Not on file    Highest education level: Not on file   Occupational History    Not on file   Tobacco Use    Smoking status: Former     Packs/day: 1.50     Years: 20.00     Pack years: 30.00     Types: Cigarettes     Quit date: 2008     Years since quittin.8    Smokeless tobacco: Never   Vaping Use    Vaping Use: Never used   Substance and Sexual Activity    Alcohol use: Not Currently    Drug use: Not Currently     Types: Marijuana    Sexual activity: Yes     Partners: Female   Other Topics Concern    Not on file   Social History Narrative    [employer] - [employer] mil[employer]         Social Determinants of Health     Financial Resource Strain: Not on file   Food Insecurity: Not on file   Transportation Needs: Not on file   Physical Activity: Not on file   Stress: Not on file   Social Connections: Not on file   Intimate Partner Violence: Not on file   Housing Stability: Not on file     Family History   Problem Relation Age of Onset    Heart Attack Father 38    Heart Disease Father     Hyperlipidemia Father     Hypertension Father     Breast Cancer Maternal Grandmother     Diabetes Paternal Grandfather          Studies    Lab Results   Component Value Date/Time    TSHULTRASEN 1.590 2021 0816        Lab Results   Component Value Date/Time    FREET4 1.10 2021 0816      No results found for: HBA1C  Lab Results   Component Value Date/Time    CHOLSTRLTOT 200 (H) 2021 08:16 AM    LDL 72 2021 08:16 AM     2021 08:16 AM    TRIGLYCERIDE 32 2021 08:16 AM       Lab Results   Component Value Date/Time     SODIUM 140 2022 08:18 AM    POTASSIUM 4.7 2022 08:18 AM    CHLORIDE 104 2022 08:18 AM    CO2 27 2022 08:18 AM    GLUCOSE 85 2022 08:18 AM    BUN 21 (H) 2022 08:18 AM    CREATININE 1.0 2022 08:18 AM     Lab Results   Component Value Date/Time    ALKPHOSPHAT 45 2022 08:18 AM    ASTSGOT 33 2022 08:18 AM    ALTSGPT 21 2022 08:18 AM    TBILIRUBIN 0.9 2022 08:18 AM        Echocardiogram:  No results found for this or any previous visit.    EC22 personally reviewed and interpreted  Sinus bradycardia HR 49bpm   Left ventricular hypertrophy with repol abnormalities (he is an endurance   athlete, so probably normal findings)   Electronically Signed On 2022 14:50:16 PDT by Coy Lira MD     Assessment and Recommendations:    Problem List Items Addressed This Visit    None  Visit Diagnoses       Preop cardiovascular exam    -  Primary    Relevant Orders    EKG (Completed)    Family history of premature CAD        Nonspecific abnormal electrocardiogram (ECG) (EKG)        Counseling on health promotion and disease prevention        Dyslipidemia        High serum high density lipoprotein (HDL)        Sinus bradycardia on ECG              EJ is doing very well from a CV standpoint without any concerns for unstable arrhythmias, HF or obstructive CAD. He is at low risk for CV complications julio-operatively. Given his baseline sinus bradycardia and being a young fit male, he might have a strong vagal reflex with anesthesia induction which can precipitate sinus pause and rarely brief sinus arrest. Something to watch out for and monitor closely julio-operatively with deep sedation.    No current indication for further CV testing or workup before surgery.    His ECG is most likely reflective of findings of an athlete heart with mild expected LVH and reporl abnormalities, and so does his physiologic systolic murmur. Baseline sinus bradycardia is stable and  asymptomatic for many years. ECG without prolonged QT.    The cardiac side-effects of lithium have been well documented, and may induce non-specific T-wave flattening, prolonged QT interval, sinus node dysfunction with sinus bradycardia and also ventricular tachycardia and ventricular fibrillation.    I encouraged him to ensure lithium levels are within expected if possible to avoid lithium toxicity and further bradycardia. Rarely, some people might need a PM to assist with higher lithium levels navigating severe bradycardia.    I congratulated him on his healthy lifestyle trying to overcome his family history of premature CAD in his father which will forever be a concern. Luckily his HDL is very high, hopefully mitigating that ASCVD risk factor.    I spent a total of 46 minutes reviewing his chart and discussing all of the above in details and answering his questions, and reassuring him.    Thank you for the opportunity to be involved in Fuentes Hawk 's care; and please reach out with any questions or concerns.    Return if symptoms worsen or fail to improve.    Coy Lira MD, MPH [facility]  Interventional Cardiologist  [facility]   of Clinical Internal Medicine - [facility]    ~ Portions of this note were completed using voice recognition software (Dragon Naturally speaking software) . Occasional transcription errors may have escaped proof reading. I have made every reasonable attempt to correct obvious errors, but I expect that there are errors of grammar and possibly content that I did not discover before finalizing the note. ~

## 2022-10-17 ENCOUNTER — TELEMEDICINE (OUTPATIENT)
Dept: BEHAVIORAL HEALTH | Facility: CLINIC | Age: 47
End: 2022-10-17
Payer: COMMERCIAL

## 2022-10-17 DIAGNOSIS — Z79.899 HIGH RISK MEDICATION USE: ICD-10-CM

## 2022-10-17 DIAGNOSIS — F15.20 CAFFEINE DEPENDENCE (HCC): ICD-10-CM

## 2022-10-17 DIAGNOSIS — F31.9 BIPOLAR 1 DISORDER (HCC): ICD-10-CM

## 2022-10-17 PROCEDURE — 99204 OFFICE O/P NEW MOD 45 MIN: CPT | Mod: 95 | Performed by: PSYCHIATRY & NEUROLOGY

## 2022-10-17 RX ORDER — LITHIUM CARBONATE 450 MG
450 TABLET, EXTENDED RELEASE ORAL
Qty: 90 TABLET | Refills: 1 | Status: SHIPPED | OUTPATIENT
Start: 2022-10-17 | End: 2022-12-21

## 2022-10-17 RX ORDER — LAMOTRIGINE 200 MG/1
200 TABLET ORAL 2 TIMES DAILY
Qty: 180 TABLET | Refills: 1 | Status: SHIPPED | OUTPATIENT
Start: 2022-10-17 | End: 2023-03-23 | Stop reason: SDUPTHER

## 2022-10-17 ASSESSMENT — ANXIETY QUESTIONNAIRES
GAD7 TOTAL SCORE: 0
4. TROUBLE RELAXING: NOT AT ALL
1. FEELING NERVOUS, ANXIOUS, OR ON EDGE: NOT AT ALL
3. WORRYING TOO MUCH ABOUT DIFFERENT THINGS: NOT AT ALL
2. NOT BEING ABLE TO STOP OR CONTROL WORRYING: NOT AT ALL
6. BECOMING EASILY ANNOYED OR IRRITABLE: NOT AT ALL
5. BEING SO RESTLESS THAT IT IS HARD TO SIT STILL: NOT AT ALL
7. FEELING AFRAID AS IF SOMETHING AWFUL MIGHT HAPPEN: NOT AT ALL

## 2022-10-17 ASSESSMENT — PATIENT HEALTH QUESTIONNAIRE - PHQ9
CLINICAL INTERPRETATION OF PHQ2 SCORE: 0
5. POOR APPETITE OR OVEREATING: 0 - NOT AT ALL

## 2022-10-17 NOTE — PROGRESS NOTES
[facility]  INITIAL PSYCHIATRY EVALUATION    This evaluation was conducted via Zoom, using secure and encrypted videoconferencing technology. The patient was physically located at their home address in [place], [place], and the physician was located at her home office in [place], [place]. The patient was presented by self. The patient’s identity was confirmed and verbal consent for the telemedicine encounter was obtained.      CC:  Initial Evaluation and Medication Management of Mental Health Symptoms      History Of Present Illness:  Fuentes Hawk is a 47 y.o. 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     The patient reported the following:  He was diagnosed with Bipolar DO I approx 2005 and tried many medications and the combination of Lamictal and Lithium worked best for him.  His dose of Lithium was increased from  mg BID to TID in August when he felt that he was heading into a manic episode, was having hypomanic symptoms and having an elevated mood.  He fears is depressive episodes the most.  In the past he has developed SI, never made an attempt.  He went off of medications b/t 2011 - 2016, felt good and didn't think he needed them but then had a significant manic episode - needs less sleep, racing thoughts, not able to concentrate, risk taking behavior, no psychosis but in the past has had paranoid thinking and wanting to avoid people during a manic episode.  He has never needed hospitalization.  Manic episodes can last for a couple of weeks and depressive episodes can last up to 4 months.  Sleeps about 5 to 6 hours per night.    He first noticed Bipolar symptoms at age 13 and started drinking alcohol - no ETOH in 20 years, wasn't diagnosed until age 29.        ROS: As noted above in HPI.  Recent partial knee  "replacement that went really well.  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 Recovery with non-opioid pain treatment alternatives      Past Psychiatric History:  Denies any hospitalizations  Endorses hx of SI during a depressive episode. Denies any history SA or self harm  Medication trials:    Valproic Acid  Effexor  Zyprexa  Vraylar -\"felt flat\" \"no emotions\"      Family Psychiatric History:  None    Substance Use/Addiction History:  Alcohol:  None in 20 years - uses his sober support system  Cannabis:  None since age 20  Tobacco:  Quit smoking age 34  Caffeine:  up to 4 cups of coffee/day  Other:  Denies any other substances    Social History:  From [place], works as a , previously  x 20 years, 3 children.  Denies any hx of abuse or trauma, enjoyed social aspect of school growing up.  Started struggling emotionally age 13 due to bipolar symptoms.  Hobbies:  endurance athlete - running and cycling, traveling, played baseball in college and his son now plays and enjoys this hobby with his son.    Allergies:  Ciprofloxacin and Other drug      Physical Examination and Mental Status Exam:  Vital signs: There were no vitals taken for this visit.    CONSTITUTIONAL:  General Appearance:  Clean, casual attire, good eye contact, engaged with provider    ORIENTATION:  Oriented to time, place and person  RECENT AND REMOTE MEMORY:  Grossly intact  ATTENTION SPAN AND CONCENTRATION:  within normal range  LANGUAGE:  no deficits appreciated  FUND OF KNOWLEDGE:  has awareness of current events, past history and normal vocabulary  SPEECH:  normal volume, amount, rate and articulation, no perseveration or paucity of language  MOOD:  Euthymic   AFFECT:  Congruent with mood  THOUGHT PROCESS:  logical and goal directed  THOUGHT CONTENT:  Denies any SI/HI or AVH, no delusional thinking nor preoccupations appreciated  ASSOCIATIONS:  " Intact, not loose, no tangentiality or circumstantiality  MEMORY:  No gross evidence of memory deficits  JUDGMENT:  adequate concerning everyday activities  INSIGHT:  adequate to psychiatric condition    DIAGNOSTIC IMPRESSION:  1. High risk medication use  - LITHIUM; Future  - TSH; Future  - FREE THYROXINE; Future    2. Bipolar 1 disorder (HCC)  - lamotrigine (LAMICTAL) 200 MG tablet; Take 1 Tablet by mouth 2 times a day.  Dispense: 180 Tablet; Refill: 1  - lithium carbonate  MG Tab CR tablet; Take 1 Tablet by mouth 3 times a day.  Dispense: 90 Tablet; Refill: 1       Assessment and Plan:  The patient's risk of suicide is assessed as low.  1.  Bipolar DO I, well controlled with medications  Caffeine Dependence  Alcohol Use DO, in sustained remission, even through a manic and depressive episode off medications  Taper down caffeine to 1 cup/day - educated the patient about impact to sleep and anxiety  Continue Lamictal 200 mg BID  Continue Lithium  mg TID  Obtain Lithium level and thyroid level  Most recent Lithium level was 0.6 on lower dose, Lithium  mg BID and not TID  Reviewed medical record and most recent lab work  Reviewed cardiac SE of Lamictal and Lithium with the patient, including - cardiac arrhythmias - had recent EKG and saw a cardiologist b/c HR resting is 37 BPM, educated him about symptoms of arrhythmias - dizziness, palpitations, fainting    2.  Developed a safety plan with the patient which included the 1800 crisis line phone and text lines or going to the nearest ED if symptoms worsen    3.  Risks, benefits, alternatives and side effects were discussed for all medicines prescribed at this visit.  The patient voiced understanding providing informed consent.  The patient agrees to call the clinic with any questions or concerns, or seek emergent medical care if warranted.    4.  Follow up in 6 months or call sooner PRN    The proposed treatment plan was discussed with the patient who  was provided the opportunity to ask questions and make suggestions regarding alternative treatment. Patient verbalized understanding and expressed agreement with the plan.     Joi Torres M.D.      This note was created using voice recognition software (Dragon). The accuracy of the dictation is limited by the abilities of the software. I have reviewed the note prior to signing, however some errors in grammar and context are still possible. If you have any questions related to this note please do not hesitate to contact our office.

## 2022-10-24 ENCOUNTER — HOSPITAL ENCOUNTER (OUTPATIENT)
Facility: MEDICAL CENTER | Age: 47
End: 2022-10-24
Attending: PHYSICIAN ASSISTANT
Payer: COMMERCIAL

## 2022-10-24 ENCOUNTER — OFFICE VISIT (OUTPATIENT)
Dept: MEDICAL GROUP | Facility: PHYSICIAN GROUP | Age: 47
End: 2022-10-24
Payer: COMMERCIAL

## 2022-10-24 VITALS
TEMPERATURE: 98.3 F | WEIGHT: 171 LBS | RESPIRATION RATE: 16 BRPM | BODY MASS INDEX: 22.66 KG/M2 | HEIGHT: 73 IN | DIASTOLIC BLOOD PRESSURE: 80 MMHG | SYSTOLIC BLOOD PRESSURE: 120 MMHG | HEART RATE: 68 BPM | OXYGEN SATURATION: 93 %

## 2022-10-24 DIAGNOSIS — R35.0 URINARY FREQUENCY: ICD-10-CM

## 2022-10-24 LAB
APPEARANCE UR: CLEAR
BILIRUB UR STRIP-MCNC: NEGATIVE MG/DL
COLOR UR AUTO: YELLOW
GLUCOSE UR STRIP.AUTO-MCNC: NEGATIVE MG/DL
KETONES UR STRIP.AUTO-MCNC: NEGATIVE MG/DL
LEUKOCYTE ESTERASE UR QL STRIP.AUTO: NEGATIVE
NITRITE UR QL STRIP.AUTO: NEGATIVE
PH UR STRIP.AUTO: 7 [PH] (ref 5–8)
PROT UR QL STRIP: NEGATIVE MG/DL
RBC UR QL AUTO: NEGATIVE
SP GR UR STRIP.AUTO: 1.02
UROBILINOGEN UR STRIP-MCNC: 0.2 MG/DL

## 2022-10-24 PROCEDURE — 99214 OFFICE O/P EST MOD 30 MIN: CPT | Performed by: PHYSICIAN ASSISTANT

## 2022-10-24 PROCEDURE — 87086 URINE CULTURE/COLONY COUNT: CPT

## 2022-10-24 PROCEDURE — 81002 URINALYSIS NONAUTO W/O SCOPE: CPT | Performed by: PHYSICIAN ASSISTANT

## 2022-10-24 ASSESSMENT — FIBROSIS 4 INDEX: FIB4 SCORE: 2.05

## 2022-10-24 NOTE — PROGRESS NOTES
CC:  Chief Complaint   Patient presents with    UTI     Sx's started Tuesday. Sx's of High Urinary frequency. Concerned for prostate issues.        HISTORY OF PRESENT ILLNESS: Patient is a 47 y.o. male established patient presenting urinary frequency and urgency. Also feels pressure. No dysuria. Had UTI years ago from enlarged prostate. No obstructive urinary sxs. No fevers or back pain.       No problem-specific Assessment & Plan notes found for this encounter.      Patient Active Problem List    Diagnosis Date Noted    Caffeine dependence ([facility]) 10/17/2022    Vitamin D deficiency 2022    Abdominal bloating 2022    Wellness examination 2019    Left leg pain 10/08/2019    Mixed hyperlipidemia 2019    Bipolar 1 disorder ([facility]) 2019    Prostatitis 2019      Allergies:Ciprofloxacin and Other drug    Current Outpatient Medications   Medication Sig Dispense Refill    lamotrigine (LAMICTAL) 200 MG tablet Take 1 Tablet by mouth 2 times a day. 180 Tablet 1    lithium carbonate  MG Tab CR tablet Take 1 Tablet by mouth 3 times a day. 90 Tablet 1    ondansetron (ZOFRAN ODT) 4 MG TABLET DISPERSIBLE Take 1 Tablet by mouth every 6 hours as needed for Nausea. 20 Tablet 0    vitamin D2, Ergocalciferol, (DRISDOL) 1.25 MG (90420 UT) Cap capsule Take 1 Capsule by mouth every 7 days. 12 Capsule 3     No current facility-administered medications for this visit.       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     - Gastrointestinal: Negative for heartburn, nausea, vomiting, abdominal pain, hematochezia, melena, diarrhea, constipation, and greasy/foul-smelling stools.     - Genitourinary:Positive for urgency, frequency.  Negative for dysuria, hematuria, pyuria,  and urinary incontinence.     - Musculoskeletal: Negative for myalgias, back pain, and joint pain.         Exam:    /80 (BP Location: Left arm, Patient Position: Sitting, BP Cuff Size: Small adult)   Pulse 68   Temp 36.8 °C (98.3 °F) (Temporal)   Resp 16   Ht 1.854 m (6' 1\")   Wt 77.6 kg (171 lb)   SpO2 93%  Body mass index is 22.56 kg/m².    General:  Well nourished, well developed male in NAD  Head is grossly normal.  Neck: Supple. Thyroid is not enlarged.  Skin: Warm and dry. No obvious lesions  Neuro: Normal muscle tone. Gait normal. Alert and oriented.  Psych: Normal mood and affect      Please note that this dictation was created using voice recognition software. I have made every reasonable attempt to correct obvious errors, but I expect that there are errors of grammar and possibly content that I did not discover before finalizing the note.    LABS: 10/24/22: Results reviewed and discussed with the patient, questions answered.    Assessment/Plan:  1. Urinary frequency  UA today is normal. Will send out for culture and f/u with results.   - POCT Urinalysis  - URINE CULTURE(NEW); Future             " Implemented All Universal Safety Interventions:  [place] to call system. Call bell, personal items and telephone within reach. Instruct patient to call for assistance. Room bathroom lighting operational. Non-slip footwear when patient is off stretcher. Physically safe environment: no spills, clutter or unnecessary equipment. Stretcher in lowest position, wheels locked, appropriate side rails in place.

## 2022-10-25 DIAGNOSIS — R35.0 URINARY FREQUENCY: ICD-10-CM

## 2022-10-27 LAB
BACTERIA UR CULT: NORMAL
SIGNIFICANT IND 70042: NORMAL
SITE SITE: NORMAL
SOURCE SOURCE: NORMAL

## 2022-11-03 ENCOUNTER — OFFICE VISIT (OUTPATIENT)
Dept: MEDICAL GROUP | Facility: PHYSICIAN GROUP | Age: 47
End: 2022-11-03
Payer: COMMERCIAL

## 2022-11-03 ENCOUNTER — HOSPITAL ENCOUNTER (OUTPATIENT)
Facility: MEDICAL CENTER | Age: 47
End: 2022-11-03
Attending: NURSE PRACTITIONER
Payer: COMMERCIAL

## 2022-11-03 VITALS — HEIGHT: 73 IN | BODY MASS INDEX: 23.27 KG/M2 | RESPIRATION RATE: 16 BRPM | WEIGHT: 175.6 LBS

## 2022-11-03 DIAGNOSIS — Z23 NEED FOR VACCINATION: ICD-10-CM

## 2022-11-03 DIAGNOSIS — R35.0 URINARY FREQUENCY: ICD-10-CM

## 2022-11-03 DIAGNOSIS — Z00.00 PE (PHYSICAL EXAM), ANNUAL: ICD-10-CM

## 2022-11-03 DIAGNOSIS — N41.9 PROSTATITIS, UNSPECIFIED PROSTATITIS TYPE: ICD-10-CM

## 2022-11-03 LAB — AMBIGUOUS DTTM AMBI4: NORMAL

## 2022-11-03 PROCEDURE — 87491 CHLMYD TRACH DNA AMP PROBE: CPT

## 2022-11-03 PROCEDURE — 99214 OFFICE O/P EST MOD 30 MIN: CPT | Mod: 25 | Performed by: NURSE PRACTITIONER

## 2022-11-03 PROCEDURE — 90686 IIV4 VACC NO PRSV 0.5 ML IM: CPT | Performed by: NURSE PRACTITIONER

## 2022-11-03 PROCEDURE — 90471 IMMUNIZATION ADMIN: CPT | Performed by: NURSE PRACTITIONER

## 2022-11-03 PROCEDURE — 87591 N.GONORRHOEAE DNA AMP PROB: CPT

## 2022-11-03 RX ORDER — SULFAMETHOXAZOLE AND TRIMETHOPRIM 800; 160 MG/1; MG/1
1 TABLET ORAL 2 TIMES DAILY
Qty: 28 TABLET | Refills: 0 | Status: SHIPPED | OUTPATIENT
Start: 2022-11-03 | End: 2022-11-17

## 2022-11-03 ASSESSMENT — FIBROSIS 4 INDEX: FIB4 SCORE: 2.05

## 2022-11-03 NOTE — PROGRESS NOTES
Chief Complaint   Patient presents with    Lab Results       HISTORY OF PRESENT ILLNESS: Patient is a 47 y.o. male, established patient who presents today to discuss medical problems as listed below:    Health Maintenance:  COMPLETED    Urinary frequency  No problem.  This is a chronic intermittent problem initially started back in 2019 and exacerbated recently few weeks ago.  Main symptoms include urinary frequency during the day, no nocturia.  Also admits to incomplete bladder symptom and dribbling.  He also feels pressure in the lower pelvic.  Symptoms are more noted around intimacy.  Blood in urine.  Recent urine testing with culture within normal limits (10/24/22).  Most recent PSA from 7/ 2022 as well as from 2019 within normal limits at 0.4  No back pain, no flank pain, no fevers.  No STI was ruled out.  No concerns however, partner.  No blood in urine, no blood per rectum.  In 2019 he was diagnosed with prostatitis and had 2 weeks of Bactrim regimen after which his symptoms improved.  Patient admits to drinking coffee, 4-5 coffee per day, well hydrated.     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 28 Tablet 0    lamotrigine (LAMICTAL) 200 MG tablet Take 1 Tablet by mouth 2 times a day. 180 Tablet 1    lithium carbonate  MG Tab CR tablet Take 1 Tablet by mouth 3 times a day. 90 Tablet 1    ondansetron (ZOFRAN ODT) 4 MG TABLET DISPERSIBLE Take 1 Tablet by mouth every 6  hours as needed for Nausea. 20 Tablet 0    vitamin D2, Ergocalciferol, (DRISDOL) 1.25 MG (99081 UT) Cap capsule Take 1 Capsule by mouth every 7 days. 12 Capsule 3     No current facility-administered medications for this visit.       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    Review of Systems:     - Constitutional: Negative for fever, chills, unexpected weight change, and fatigue/generalized weakness.     - HEENT: Negative for headaches, vision changes, hearing changes, ear pain, ear discharge, rhinorrhea, sinus congestion, sore throat, and neck pain.      - Respiratory: Negative for cough, sputum production, chest congestion, dyspnea, wheezing, and crackles.      - Cardiovascular: Negative for chest pain, palpitations, orthopnea, and bilateral lower extremity edema.     - Gastrointestinal: Negative for heartburn, nausea, vomiting, abdominal pain, hematochezia, melena, diarrhea, constipation, and greasy/foul-smelling stools.     - Genitourinary: Negative for dysuria, polyuria, hematuria, pyuria, urinary urgency, and urinary incontinence.    - Musculoskeletal: Negative for myalgias, back pain, and joint pain.     - Skin: Negative for rash, itching, cyanotic skin color  "change.     - Neurological: Negative for dizziness, tingling, tremors, focal sensory deficit, focal weakness and headaches.     - Endo/Heme/Allergies: Does not bruise/bleed easily.     - Psychiatric/Behavioral: Negative for depression, suicidal/homicidal ideation and memory loss.      All other systems reviewed and are negative    Exam:    BP (P) 110/76 (BP Location: Left arm, Patient Position: Sitting, BP Cuff Size: Adult)   Pulse (!) (P) 55   Temp (P) 37.1 °C (98.7 °F) (Temporal)   Resp 16   Ht 1.854 m (6' 1\")   Wt 79.7 kg (175 lb 9.6 oz)   SpO2 (P) 98%   BMI 23.17 kg/m²  Body mass index is 23.17 kg/m².    Physical Exam:  Constitutional: Well-developed and well-nourished. Not diaphoretic. No distress.   Skin: Skin is warm and dry. No rash noted.  Head: Atraumatic without lesions.  Eyes: Conjunctivae and extraocular motions are normal. Pupils are equal, round, and reactive to light. No scleral icterus.   Ears:  External ears unremarkable. Tympanic membranes clear and intact.  Nose: Nares patent. Septum midline. Turbinates without erythema nor edema. No discharge.   Mouth/Throat: Dentition is normal. Tongue normal. Oropharynx is clear and moist. Posterior pharynx without erythema or exudates.  Neck: Supple, trachea midline. Normal range of motion. No thyromegaly present. No lymphadenopathy--cervical or supraclavicular.  Cardiovascular: Regular rate and rhythm, S1 and S2 without murmur, rubs, or gallops.    Chest: Effort normal. Clear to auscultation throughout. No adventitious sounds. No CVA tenderness.  Abdomen: Soft, non tender, and without distention. Active bowel sounds in all four quadrants. No rebound, guarding, masses or HSM.  : Negative for dysuria, polyuria, hematuria, pyuria, urinary urgency, and urinary incontinence.  Extremities: No cyanosis, clubbing, erythema, nor edema. Distal pulses intact and symmetric.   Musculoskeletal: All major joints AROM full in all directions without " pain.  Neurological: Alert and oriented x 3. DTRs 2+/3 and symmetric. No cranial nerve deficit. 5/5 myotomes. Sensation intact. Negative Rhomberg.  Psychiatric:  Behavior, mood, and affect are appropriate.  MA/nursing note and vitals reviewed.    LABS: 2021  results reviewed and discussed with the patient, questions answered.       Assessment/Plan:  1. Need for vaccination  - Influenza Vaccine Quad Injection (PF)    2. Urinary frequency  Suspecting prostatitis, will evaluate for STIs.  Patient advised to decrease coffee intake and limit to 1 cup of coffee, electrolyte water to avoid excessive diuresing.   - Chlamydia/GC, PCR (Urine); Future  - sulfamethoxazole-trimethoprim (BACTRIM DS) 800-160 MG tablet; Take 1 Tablet by mouth 2 times a day for 14 days.  Dispense: 28 Tablet; Refill: 0  - CBC WITHOUT DIFFERENTIAL; Future  - PSA TOTAL W/FREE PSA REFLEX; Future    3. Prostatitis, unspecified prostatitis type  Of Bactrim x2 weeks.  We will follow-up in 2 weeks.  - sulfamethoxazole-trimethoprim (BACTRIM DS) 800-160 MG tablet; Take 1 Tablet by mouth 2 times a day for 14 days.  Dispense: 28 Tablet; Refill: 0  - CBC WITHOUT DIFFERENTIAL; Future  - PSA TOTAL W/FREE PSA REFLEX; Future    4. PE (physical exam), annual  - CBC WITHOUT DIFFERENTIAL; Future  - Comp Metabolic Panel; Future  - Lipid Profile; Future  - VITAMIN D,25 HYDROXY (DEFICIENCY); Future  - TSH; Future  - TSH WITH REFLEX TO FT4; Future  - T3 FREE; Future  - PSA TOTAL W/FREE PSA REFLEX; Future  - VITAMIN B12; Future  - LITHIUM; Future       Discussed with patient possible alternative diagnoses, patient is to take all medications as prescribed.      If symptoms persist FU w/PCP, if symptoms worsen go to emergency room.      If experiencing any side effects from prescribed medications report to the office immediately or go to emergency room.     Reviewed indication, dosage, usage and potential adverse effects of prescribed medications.      Reviewed risks and  benefits of treatment plan. Patient verbalizes understanding of all instruction and verbally agrees to plan.     Discussed plan with the patient, and patient agrees to the above.      I personally reviewed prior external notes and test results pertinent to today's visit.      No follow-ups on file. 2 wks

## 2022-11-03 NOTE — ASSESSMENT & PLAN NOTE
No problem.  This is a chronic intermittent problem initially started back in 2019 and exacerbated recently few weeks ago.  Main symptoms include urinary frequency during the day, no nocturia.  Also admits to incomplete bladder symptom and dribbling.  He also feels pressure in the lower pelvic.  Symptoms are more noted around intimacy.  Blood in urine.  Recent urine testing with culture within normal limits (10/24/22).  Most recent PSA from 7/ 2022 as well as from 2019 within normal limits at 0.4  No back pain, no flank pain, no fevers.  No STI was ruled out.  No concerns however, partner.  No blood in urine, no blood per rectum.  In 2019 he was diagnosed with prostatitis and had 2 weeks of Bactrim regimen after which his symptoms improved.  Patient admits to drinking coffee, 4-5 coffee per day, well hydrated.

## 2022-11-04 LAB
C TRACH DNA SPEC QL NAA+PROBE: NEGATIVE
N GONORRHOEA DNA SPEC QL NAA+PROBE: NEGATIVE
SPECIMEN SOURCE: NORMAL

## 2022-12-20 DIAGNOSIS — F31.9 BIPOLAR 1 DISORDER (HCC): ICD-10-CM

## 2022-12-21 ENCOUNTER — TELEMEDICINE (OUTPATIENT)
Dept: MEDICAL GROUP | Facility: MEDICAL CENTER | Age: 47
End: 2022-12-21
Payer: COMMERCIAL

## 2022-12-21 VITALS — WEIGHT: 170 LBS | HEIGHT: 73 IN | BODY MASS INDEX: 22.53 KG/M2

## 2022-12-21 DIAGNOSIS — U07.1 COVID: ICD-10-CM

## 2022-12-21 DIAGNOSIS — Z12.11 SCREENING FOR COLORECTAL CANCER: ICD-10-CM

## 2022-12-21 DIAGNOSIS — Z12.12 SCREENING FOR COLORECTAL CANCER: ICD-10-CM

## 2022-12-21 DIAGNOSIS — R68.89 FLU-LIKE SYMPTOMS: ICD-10-CM

## 2022-12-21 DIAGNOSIS — R05.1 ACUTE COUGH: ICD-10-CM

## 2022-12-21 PROCEDURE — 99213 OFFICE O/P EST LOW 20 MIN: CPT | Mod: 95 | Performed by: NURSE PRACTITIONER

## 2022-12-21 RX ORDER — LITHIUM CARBONATE 450 MG
TABLET, EXTENDED RELEASE ORAL
Qty: 90 TABLET | Refills: 1 | Status: SHIPPED | OUTPATIENT
Start: 2022-12-21 | End: 2023-02-13

## 2022-12-21 RX ORDER — BENZONATATE 100 MG/1
100 CAPSULE ORAL 3 TIMES DAILY PRN
Qty: 60 CAPSULE | Refills: 0 | Status: SHIPPED | OUTPATIENT
Start: 2022-12-21 | End: 2023-03-23

## 2022-12-21 ASSESSMENT — FIBROSIS 4 INDEX: FIB4 SCORE: 2.05

## 2022-12-21 NOTE — TELEPHONE ENCOUNTER
Received request via: Pharmacy    Was the patient seen in the last year in this department? Yes    Does the patient have an active prescription (recently filled or refills available) for medication(s) requested? No    Does the patient have care home Plus and need 100 day supply (blood pressure, diabetes and cholesterol meds only)? Medication is not for cholesterol, blood pressure or diabetes and Patient does not have SCP

## 2022-12-22 NOTE — PROGRESS NOTES
Virtual Visit: Established Patient   This visit was conducted via Zoom using secure and encrypted videoconferencing technology.   The patient was in their home in the state of [place].    The patient's identity was confirmed and verbal consent was obtained for this virtual visit.     Subjective:   CC:   Chief Complaint   Patient presents with    Coronavirus Screening       Fuentes Jorge Hawk is a 47 y.o. male presenting for evaluation and management of:    Flu-like symptoms  New problem. Symptoms started this morning, cough, body aches, HA, took home Covid test and Positive. No fever. No sick contacts. Son and fiancé similar symptoms.  Son was diagnosed with COVID.  Fiancé tested negative. Son lives at his mom's.     ROS     Current medicines (including changes today)  Current Outpatient Medications   Medication Sig Dispense Refill    lithium carbonate  MG Tab CR tablet TAKE ONE TABLET BY MOUTH THREE TIMES A DAY 90 Tablet 1    Nirmatrelvir&Ritonavir 300/100 20 x 150 MG & 10 x 100MG Tablet Therapy Pack Take 300 mg nirmatrelvir (two 150 mg tablets) with 100 mg ritonavir (one 100 mg tablet) by mouth, with all three tablets taken together twice daily for 5 days. 30 Each 0    benzonatate (TESSALON) 100 MG Cap Take 1 Capsule by mouth 3 times a day as needed for Cough. 60 Capsule 0    lamotrigine (LAMICTAL) 200 MG tablet Take 1 Tablet by mouth 2 times a day. 180 Tablet 1    ondansetron (ZOFRAN ODT) 4 MG TABLET DISPERSIBLE Take 1 Tablet by mouth every 6 hours as needed for Nausea. 20 Tablet 0    vitamin D2, Ergocalciferol, (DRISDOL) 1.25 MG (24517 UT) Cap capsule Take 1 Capsule by mouth every 7 days. 12 Capsule 3     No current facility-administered medications for this visit.       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  Skin: No rashes in visible areas.  Eye: Round. Conjunctiva clear, lids normal. No icterus.   ENMT: Lips pink without lesions, good dentition, moist mucous membranes. Phonation normal.  Neck: No masses, no thyromegaly. Moves freely without pain.  Respiratory: Unlabored respiratory effort, no cough or audible wheeze  Psych: Alert and oriented x3, normal affect and mood.     Assessment and Plan:   The following treatment plan was discussed:   1. Flu-like symptoms    2. Screening for colorectal cancer  - Referral to GI for Colonoscopy    3. COVID  Rx, supportive care, fluids and rest. CDC guidelines discussed, pt verbalized understanding.  - Nirmatrelvir&Ritonavir 300/100 20 x 150 MG & 10 x 100MG Tablet Therapy Pack; Take 300 mg nirmatrelvir (two 150 mg tablets) with 100 mg ritonavir (one 100 mg tablet) by mouth, with all three tablets taken together twice daily for 5 days.  Dispense: 30 Each; Refill: 0    4. Acute cough  - benzonatate (TESSALON) 100 MG Cap; Take 1 Capsule by mouth 3 times a day as needed for Cough.  Dispense: 60 Capsule; Refill: 0       Discussed with patient possible alternative diagnoses, patient is to take all medications as prescribed.      If symptoms persist FU w/PCP, if symptoms worsen go to emergency room.      If experiencing any side effects from prescribed medications report to the office immediately or go to emergency room.     Reviewed indication, dosage, usage and potential adverse effects of prescribed medications.      Reviewed risks and benefits of treatment plan. Patient verbalizes understanding of all instruction and verbally agrees to plan.     Discussed plan with the patient, and " patient agrees to the above.      I personally reviewed prior external notes and test results pertinent to today's visit.     Follow-up:

## 2022-12-22 NOTE — ASSESSMENT & PLAN NOTE
New problem. Symptoms started this morning, cough, body aches, HA, took home Covid test and Positive. No fever. No sick contacts. Son and fiancé similar symptoms.  Son was diagnosed with COVID.  Fiancé tested negative. Son lives at his mom's.

## 2023-01-03 ENCOUNTER — TELEMEDICINE (OUTPATIENT)
Dept: MEDICAL GROUP | Facility: MEDICAL CENTER | Age: 48
End: 2023-01-03
Payer: COMMERCIAL

## 2023-01-03 VITALS — WEIGHT: 170 LBS | BODY MASS INDEX: 22.53 KG/M2 | HEIGHT: 73 IN

## 2023-01-03 DIAGNOSIS — U07.1 COVID: ICD-10-CM

## 2023-01-03 DIAGNOSIS — R09.81 SINUS CONGESTION: ICD-10-CM

## 2023-01-03 DIAGNOSIS — R05.9 COUGH, UNSPECIFIED TYPE: ICD-10-CM

## 2023-01-03 DIAGNOSIS — J02.9 SORE THROAT: ICD-10-CM

## 2023-01-03 PROCEDURE — 99214 OFFICE O/P EST MOD 30 MIN: CPT | Mod: 95 | Performed by: NURSE PRACTITIONER

## 2023-01-03 RX ORDER — GUAIFENESIN 600 MG/1
600 TABLET, EXTENDED RELEASE ORAL EVERY 12 HOURS
Qty: 60 TABLET | Refills: 1 | Status: SHIPPED | OUTPATIENT
Start: 2023-01-03 | End: 2023-01-13

## 2023-01-03 RX ORDER — TRIAMCINOLONE ACETONIDE 55 UG/1
2 SPRAY, METERED NASAL DAILY
Qty: 16.9 ML | Refills: 0 | Status: SHIPPED | OUTPATIENT
Start: 2023-01-03 | End: 2023-03-23

## 2023-01-03 RX ORDER — BENZONATATE 100 MG/1
100 CAPSULE ORAL 3 TIMES DAILY PRN
Qty: 60 CAPSULE | Refills: 0 | Status: SHIPPED | OUTPATIENT
Start: 2023-01-03 | End: 2023-03-23

## 2023-01-03 ASSESSMENT — FIBROSIS 4 INDEX: FIB4 SCORE: 2.05

## 2023-01-03 NOTE — ASSESSMENT & PLAN NOTE
Flu like s/s started 2 wks ago, tested positive for Covid, felt better. Took Paxlovid. Similar s/s rebounded last night. Tested positive for Covid again.   Symptoms now include rhinitis, sinus congestion, mild HA, sore throat, PND and dry cough. No ear pain,  No fever.   SO tested positive for Covid 2wks ago, recovered.

## 2023-01-03 NOTE — PROGRESS NOTES
Virtual Visit: Established Patient   This visit was conducted via Zoom using secure and encrypted videoconferencing technology.   The patient was in their home in the state of [place].    The patient's identity was confirmed and verbal consent was obtained for this virtual visit.     Subjective:   CC:   Chief Complaint   Patient presents with    Follow-Up     Covid 14 days positive Guerdaid        Fuentes Hawk is a 47 y.o. male presenting for evaluation and management of:    COVID  Flu like s/s started 2 wks ago, tested positive for Covid, felt better. Took Paxlovid. Similar s/s rebounded last night. Tested positive for Covid again.   Symptoms now include rhinitis, sinus congestion, mild HA, sore throat, PND and dry cough. No ear pain,  No fever.   SO tested positive for Covid 2wks ago, recovered.      ROS     Current medicines (including changes today)  Current Outpatient Medications   Medication Sig Dispense Refill    guaiFENesin ER (MUCINEX) 600 MG TABLET SR 12 HR Take 1 Tablet by mouth every 12 hours for 10 days. 60 Tablet 1    triamcinolone (NASACORT) 55 MCG/ACT nasal inhaler Administer 2 Sprays into affected nostril(S) every day. 16.9 mL 0    benzonatate (TESSALON) 100 MG Cap Take 1 Capsule by mouth 3 times a day as needed for Cough. 60 Capsule 0    sore throat spray (CHLORASEPTIC) 1.4 % Liquid Use 1 Spray in the mouth or throat 4 times a day as needed (sore throat). 177 mL 1    lithium carbonate  MG Tab CR tablet TAKE ONE TABLET BY MOUTH THREE TIMES A DAY 90 Tablet 1    benzonatate (TESSALON) 100 MG Cap Take 1 Capsule by mouth 3 times a day as needed for Cough. 60 Capsule 0    lamotrigine (LAMICTAL) 200 MG tablet Take 1 Tablet by mouth 2 times a day. 180 Tablet 1    Nirmatrelvir&Ritonavir 300/100 20 x 150 MG & 10 x 100MG Tablet Therapy Pack Take 300 mg nirmatrelvir (two 150 mg tablets) with 100 mg ritonavir (one 100 mg tablet) by mouth, with all three tablets taken together twice daily for 5  "days. (Patient not taking: Reported on 1/3/2023) 30 Each 0    ondansetron (ZOFRAN ODT) 4 MG TABLET DISPERSIBLE Take 1 Tablet by mouth every 6 hours as needed for Nausea. (Patient not taking: Reported on 1/3/2023) 20 Tablet 0    vitamin D2, Ergocalciferol, (DRISDOL) 1.25 MG (24729 UT) Cap capsule Take 1 Capsule by mouth every 7 days. (Patient not taking: Reported on 1/3/2023) 12 Capsule 3     No current facility-administered medications for this visit.       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  Skin: No rashes in visible areas.  Eye: Round. Conjunctiva clear, lids normal. No icterus.   ENMT: Lips pink without lesions, good dentition, moist mucous membranes. Phonation normal.  Neck: No masses, no thyromegaly. Moves freely without pain.  Respiratory: Unlabored respiratory effort, no cough or audible wheeze  Psych: Alert and oriented x3, normal affect and mood.     Assessment and Plan:   The following treatment plan was discussed:   1. COVID  5-10 days self isolation until symptoms free, wear a mask in public, hand hygiene. Follow latest CDC guidelines.    2. Cough, unspecified type  RX, supportive care, warm fluids, rest, supplement if tolerating vit C, D, and zinc. Avoid dairy and cold fluids. F/u  Friday If no improvement.  - benzonatate (TESSALON) 100 MG Cap; Take 1 Capsule by mouth 3 times a day as needed for Cough.  Dispense: 60 Capsule; Refill: 0    3. Sore throat  - sore throat spray (CHLORASEPTIC) " 1.4 % Liquid; Use 1 Spray in the mouth or throat 4 times a day as needed (sore throat).  Dispense: 177 mL; Refill: 1    4. Sinus congestion  - guaiFENesin ER (MUCINEX) 600 MG TABLET SR 12 HR; Take 1 Tablet by mouth every 12 hours for 10 days.  Dispense: 60 Tablet; Refill: 1  - triamcinolone (NASACORT) 55 MCG/ACT nasal inhaler; Administer 2 Sprays into affected nostril(S) every day.  Dispense: 16.9 mL; Refill: 0       Discussed with patient possible alternative diagnoses, patient is to take all medications as prescribed.      If symptoms persist FU w/PCP, if symptoms worsen go to emergency room.      If experiencing any side effects from prescribed medications report to the office immediately or go to emergency room.     Reviewed indication, dosage, usage and potential adverse effects of prescribed medications.      Reviewed risks and benefits of treatment plan. Patient verbalizes understanding of all instruction and verbally agrees to plan.     Discussed plan with the patient, and patient agrees to the above.      I personally reviewed prior external notes and test results pertinent to today's visit.     Follow-up: Friday if no improvement or getting worse

## 2023-02-13 DIAGNOSIS — F31.9 BIPOLAR 1 DISORDER (HCC): ICD-10-CM

## 2023-02-13 RX ORDER — LITHIUM CARBONATE 450 MG
450 TABLET, EXTENDED RELEASE ORAL 3 TIMES DAILY
Qty: 270 TABLET | Refills: 0 | Status: SHIPPED
Start: 2023-02-13 | End: 2023-03-23

## 2023-02-16 ENCOUNTER — TELEMEDICINE (OUTPATIENT)
Dept: MEDICAL GROUP | Facility: MEDICAL CENTER | Age: 48
End: 2023-02-16
Payer: COMMERCIAL

## 2023-02-16 VITALS — BODY MASS INDEX: 22.53 KG/M2 | HEIGHT: 73 IN | WEIGHT: 170 LBS

## 2023-02-16 DIAGNOSIS — F15.20 CAFFEINE DEPENDENCE (HCC): ICD-10-CM

## 2023-02-16 DIAGNOSIS — F31.9 BIPOLAR 1 DISORDER (HCC): ICD-10-CM

## 2023-02-16 DIAGNOSIS — Z00.00 PE (PHYSICAL EXAM), ANNUAL: ICD-10-CM

## 2023-02-16 DIAGNOSIS — R06.83 SNORING: ICD-10-CM

## 2023-02-16 DIAGNOSIS — G47.30 SLEEP APNEA, UNSPECIFIED TYPE: ICD-10-CM

## 2023-02-16 PROCEDURE — 99396 PREV VISIT EST AGE 40-64: CPT | Mod: 95 | Performed by: NURSE PRACTITIONER

## 2023-02-16 PROCEDURE — 99213 OFFICE O/P EST LOW 20 MIN: CPT | Mod: 25 | Performed by: NURSE PRACTITIONER

## 2023-02-16 ASSESSMENT — FIBROSIS 4 INDEX: FIB4 SCORE: 2.09

## 2023-02-16 NOTE — PROGRESS NOTES
Virtual Visit: Established Patient   This visit was conducted via Zoom using secure and encrypted videoconferencing technology.   The patient was in their home in the state of [place].    The patient's identity was confirmed and verbal consent was obtained for this virtual visit.     Subjective:   CC: No chief complaint on file.      Fuentes Hawk is a 48 y.o. male presenting for evaluation and management of:    Sleep apnea  New problem. Was dx'd with sleep apnea in , at that time wt was around 300 lbs. Was on a C-pap.  Changed lifestyle 8 yrs ago and now at ideal wt 170s.  Still continues to snore and waking up coughing and SOB. Denies GERD s/s. Requesting evaluation.   Elevation is not new, grew up in the area.    Caffeine dependence (HCC)  Chronic, improving. Used to consume up to 7 cups of coffee per day. Cut down to 1-2 per day and feeling great. Healthy lifestyle, nutrition and daily exercise. Continue with current defined treatment plan.  Follow-up annually.      Bipolar 1 disorder (HCC)  Chronic and stable.  Well-controlled on lithium and lamotrigine.  Followed by psychiatry, Dr. Torres.  Denies manic episodes.     ROS     Current medicines (including changes today)  Current Outpatient Medications   Medication Sig Dispense Refill    lithium carbonate  MG Tab CR tablet Take 1 Tablet by mouth 3 times a day. APPOINTMENT REQUIRED FOR ADDITIONAL REFILLS.  CALL [phone]. 270 Tablet 0    triamcinolone (NASACORT) 55 MCG/ACT nasal inhaler Administer 2 Sprays into affected nostril(S) every day. 16.9 mL 0    benzonatate (TESSALON) 100 MG Cap Take 1 Capsule by mouth 3 times a day as needed for Cough. 60 Capsule 0    sore throat spray (CHLORASEPTIC) 1.4 % Liquid Use 1 Spray in the mouth or throat 4 times a day as needed (sore throat). 177 mL 1    benzonatate (TESSALON) 100 MG Cap Take 1 Capsule by mouth 3 times a day as needed for Cough. 60 Capsule 0    lamotrigine (LAMICTAL) 200 MG  "tablet Take 1 Tablet by mouth 2 times a day. 180 Tablet 1     No current facility-administered medications for this visit.       Patient Active Problem List    Diagnosis Date Noted    Snoring 02/16/2023    Sleep apnea 02/16/2023    COVID 01/03/2023    Flu-like symptoms 12/21/2022    Urinary frequency 11/03/2022    Caffeine dependence (HCC) 10/17/2022    Vitamin D deficiency 05/20/2022    Abdominal bloating 05/20/2022    Wellness examination 11/26/2019    Left leg pain 10/08/2019    Mixed hyperlipidemia 01/22/2019    Bipolar 1 disorder (HCC) 01/08/2019    Prostatitis 01/08/2019        Objective:   Ht 1.854 m (6' 1\")   Wt 77.1 kg (170 lb)   BMI 22.43 kg/m²     Physical Exam:  Constitutional: Alert, no distress, well-groomed.  Skin: No rashes in visible areas.  Eye: Round. Conjunctiva clear, lids normal. No icterus.   ENMT: Lips pink without lesions, good dentition, moist mucous membranes. Phonation normal.  Neck: No masses, no thyromegaly. Moves freely without pain.  Respiratory: Unlabored respiratory effort, no cough or audible wheeze  Psych: Alert and oriented x3, normal affect and mood.     Assessment and Plan:   The following treatment plan was discussed:   1. Snoring  - Referral to Pulmonary and Sleep Medicine    2. Sleep apnea, unspecified type  - Referral to Pulmonary and Sleep Medicine    3. PE (physical exam), annual  - Comp Metabolic Panel; Future  - CBC WITHOUT DIFFERENTIAL; Future  - VITAMIN D,25 HYDROXY (DEFICIENCY); Future  - TSH; Future  - T3 FREE; Future  - Lipid Profile; Future  - MAGNESIUM; Future  - FREE THYROXINE; Future    4. Caffeine dependence (HCC)  Significantly improved.  Very healthy lifestyle. continue current regimen.    5. Bipolar 1 disorder (HCC)  Well-controlled.  Followed by psychiatry.  Continue.  Will obtain annual labs for August, will include lithium.     Health Maintenance  Cholesterol Screening: annual labs  Diabetes Screening: annual labs  Aspirin Use: no    Diet: regualar, " healty   Exercise: daily   Substance Abuse: no   Safe in relationship.   Seat belts, bike helmet, gun safety discussed.  Sun protection used.    Cancer screening  Colorectal Cancer Screening: enmanuel received, pt to complete    Lung Cancer Screening: n/a    Prostate Cancer Screening/PSA: at age 50     Infectious disease screening/Immunizations  --STI Screening: pt not concerned   --Practices safe sex.  --HIV Screening: n/a   --Hepatitis C Screening: n/a   --Immunizations: completed     Patient counseled as below:  History: Past illnesses, surgeries, medications, allergies, family and social histories, status of chronic conditions  Exam: Blood pressure, height, weight, BMI, hearing screening, depression screening, eyes, ENT, cardiovascular, respiratory, GI, , musculoskeletal, skin, neurological, psychological, hematological  Counseling/Anticipatory Guidance: Nutrition, physical activity, healthy weight and diet, injury prevention (wear a helmet when riding a bicycle, motocycle, skiing, snowboarding or any other sport where head protecting is advised), skin cancer prevention (use of broad spectrum SPF 30 or higher, stay in the shade, wear sunglasses, wear a hat with wide brim, wear protective clothing covering extremities), misuse of tobacco, alcohol, and drugs, sexual behavior, dental health, mental health, fall prevention immunizations, recommended screenings for age/gender  Screening Services: Cholesterol, diabetes, colorectal cancer age 45 + per USPSTF  For Men: Abnormal aortic aneurysm (one time for men 65-75 years, hx of smoking), prostate cancer    Discussed with patient possible alternative diagnoses, patient is to take all medications as prescribed.      If symptoms persist FU w/PCP, if symptoms worsen go to emergency room.      If experiencing any side effects from prescribed medications report to the office immediately or go to emergency room.     Reviewed indication, dosage, usage and potential adverse  effects of prescribed medications.      Reviewed risks and benefits of treatment plan. Patient verbalizes understanding of all instruction and verbally agrees to plan.     Discussed plan with the patient, and patient agrees to the above.      I personally reviewed prior external notes and test results pertinent to today's visit.     Follow-up: August 2023 and PRN

## 2023-02-16 NOTE — ASSESSMENT & PLAN NOTE
Chronic, improving. Used to consume up to 7 cups of coffee per day. Cut down to 1-2 per day and feeling great. Healthy lifestyle, nutrition and daily exercise. Continue with current defined treatment plan.  Follow-up annually.

## 2023-02-16 NOTE — ASSESSMENT & PLAN NOTE
Chronic and stable.  Well-controlled on lithium and lamotrigine.  Followed by psychiatry, Dr. Torres.  Denies manic episodes.

## 2023-02-16 NOTE — ASSESSMENT & PLAN NOTE
New problem. Was dx'd with sleep apnea in 2009, at that time wt was around 300 lbs. Was on a C-pap.  Changed lifestyle 8 yrs ago and now at ideal wt 170s.  Still continues to snore and waking up coughing and SOB. Denies GERD s/s. Requesting evaluation.   Elevation is not new, grew up in the area.

## 2023-03-23 ENCOUNTER — TELEMEDICINE (OUTPATIENT)
Dept: BEHAVIORAL HEALTH | Facility: CLINIC | Age: 48
End: 2023-03-23
Payer: COMMERCIAL

## 2023-03-23 DIAGNOSIS — E55.9 HYPOVITAMINOSIS D: ICD-10-CM

## 2023-03-23 DIAGNOSIS — Z79.899 HIGH RISK MEDICATION USE: ICD-10-CM

## 2023-03-23 DIAGNOSIS — G47.30 SLEEP APNEA, UNSPECIFIED TYPE: ICD-10-CM

## 2023-03-23 DIAGNOSIS — F31.9 BIPOLAR 1 DISORDER (HCC): ICD-10-CM

## 2023-03-23 PROCEDURE — 99215 OFFICE O/P EST HI 40 MIN: CPT | Mod: 95 | Performed by: PSYCHIATRY & NEUROLOGY

## 2023-03-23 RX ORDER — LITHIUM CARBONATE 300 MG/1
1200 TABLET, FILM COATED, EXTENDED RELEASE ORAL EVERY MORNING
Qty: 360 TABLET | Refills: 1 | Status: SHIPPED | OUTPATIENT
Start: 2023-03-23 | End: 2023-06-21

## 2023-03-23 RX ORDER — LAMOTRIGINE 200 MG/1
200 TABLET ORAL 2 TIMES DAILY
Qty: 180 TABLET | Refills: 1 | Status: SHIPPED | OUTPATIENT
Start: 2023-03-23 | End: 2023-08-07 | Stop reason: SDUPTHER

## 2023-03-23 NOTE — PROGRESS NOTES
[facility]  PSYCHIATRIC FOLLOW-UP NOTE    This evaluation was conducted via Zoom, using secure and encrypted videoconferencing technology. The patient was physically located at their home address in [place], [place], and the physician was located at her home office in [place], [place]. The patient was presented by self. The patient’s identity was confirmed and verbal consent for the telemedicine encounter was obtained.    CC:  Presents for follow up visit for medication evaluation and management        History Of Present Illness:  Fuentes Hawk is a 47 y.o. 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     The patient reported the following:  He had COVID in December, didn't impact his mental health, no SE to medications, continue to work well together, does notice feeling off if he forgets to take the Lamictal right away.  Takes both Lamictal and Lithium in the AM. Will be having a sleep study, snores, even though he lost weight.  He obtained lab work at an outside lab, sent via [employer]     History: He was diagnosed with Bipolar DO I approx 2005 and tried many medications and the combination of Lamictal and Lithium worked best for him.  His dose of Lithium was increased from  mg BID to TID in August when he felt that he was heading into a manic episode, was having hypomanic symptoms and having an elevated mood.  He fears is depressive episodes the most.  In the past he has developed SI, never made an attempt.  He went off of medications b/t 2011 - 2016, felt good and didn't think he needed them but then had a significant manic episode - needs less sleep, racing thoughts, not able to concentrate, risk taking behavior, no psychosis but in the past has had paranoid thinking and wanting to avoid people during a manic episode.   "He has never needed hospitalization.  Manic episodes can last for a couple of weeks and depressive episodes can last up to 4 months.  Sleeps about 5 to 6 hours per night.    He first noticed Bipolar symptoms at age 13 and started drinking alcohol - no ETOH in 20 years, wasn't diagnosed until age 29.        ROS: As noted above in HPI.  Recent partial knee replacement that went really well.  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 Recovery with non-opioid pain treatment alternatives      Past Psychiatric History:  Denies any hospitalizations  Endorses hx of SI during a depressive episode. Denies any history SA or self harm  Medication trials:    Valproic Acid  Effexor  Zyprexa  Vraylar -\"felt flat\" \"no emotions\"      Family Psychiatric History:  None    Substance Use/Addiction History:  Alcohol:  None in 20 years - uses his sober support system  Cannabis:  None since age 20  Tobacco:  Quit smoking age 34  Caffeine:  up to 4 cups of coffee/day  Other:  Denies any other substances    Social History:  From [employer], works as a , previously  x 20 years, 3 children.  Denies any hx of abuse or trauma, enjoyed social aspect of school growing up.  Started struggling emotionally age 13 due to bipolar symptoms.  Hobbies:  endurance athlete - running and cycling, traveling, played baseball in college and his son now plays and enjoys this hobby with his son.    Allergies:  Ciprofloxacin and Other drug      Physical Examination and Mental Status Exam:  Vital signs: There were no vitals taken for this visit.    CONSTITUTIONAL:  General Appearance:  Clean, casual attire, good eye contact, engaged with provider    ORIENTATION:  Oriented to time, place and person  RECENT AND REMOTE MEMORY:  Grossly intact  ATTENTION SPAN AND CONCENTRATION:  within normal range  LANGUAGE:  no deficits appreciated  FUND OF KNOWLEDGE:  has awareness " of current events, past history and normal vocabulary  SPEECH:  normal volume, amount, rate and articulation, no perseveration or paucity of language  MOOD:  Euthymic  AFFECT:  Full range  THOUGHT PROCESS:  logical and goal directed  THOUGHT CONTENT:  Denies any SI/HI or AVH, no delusional thinking nor preoccupations appreciated  ASSOCIATIONS:  Intact, not loose, no tangentiality or circumstantiality  MEMORY:  No gross evidence of memory deficits  JUDGMENT:  adequate concerning everyday activities  INSIGHT:  adequate to psychiatric condition    DIAGNOSTIC IMPRESSION:  1. Bipolar 1 disorder (HCC)  - lamotrigine (LAMICTAL) 200 MG tablet; Take 1 Tablet by mouth 2 times a day.  Dispense: 180 Tablet; Refill: 1  - lithium carbonate  MG Tab CR tablet; Take 4 Tablets by mouth every morning for 90 days.  Dispense: 360 Tablet; Refill: 1    2. High risk medication use  - LITHIUM  - Comp Metabolic Panel; Future    3. Hypovitaminosis D  - VITAMIN D 25-HYDROXY       Assessment and Plan:  The patient's risk of suicide is assessed as low.  1.  Bipolar DO I, well controlled with medications  Caffeine Dependence  Hypovitaminosis D, level 21.5 Dec 9 labs  Alcohol Use DO, in sustained remission, even through a manic and depressive episode off medications  Continue Lamictal 200 mg BID  Taper down caffeine to 1 cup/day - educated the patient about impact to sleep and anxiety  Lithium level 0.9 on 12/9/22 on a total of 1350 mg/day  Reduce Lithium ER to 1200 mg total/day from 1350 mg total/day, takes all in AM, therefore 0.9 after 24 hours is within the high range of normal  Obtain repeat Lithium level after reducing the dose and thyroid level  Reviewed medical record and most recent lab work  The patient was screened by his cardiologist and no need to change dose of Lamictal  Reviewed prior visit HPI, histories and treatment plan in preparation for today's visit      2.  The patient has a safety plan that includes calling the [phone]  crisis line number, calling 911 and/or going to the nearest Emergency Department if symptoms worsen.      3.  Risks, benefits, alternatives and side effects were discussed for all medicines prescribed at this visit.  The patient voiced understanding providing informed consent.  The patient agrees to call the clinic with any questions or concerns, or seek emergent medical care if warranted.    4.  Follow up in 3 months or call sooner PRN    The proposed treatment plan was discussed with the patient who was provided the opportunity to ask questions and make suggestions regarding alternative treatment. Patient verbalized understanding and expressed agreement with the plan.       Joi Torres M.D.      This note was created using voice recognition software (Dragon). The accuracy of the dictation is limited by the abilities of the software. I have reviewed the note prior to signing, however some errors in grammar and context are still possible. If you have any questions related to this note please do not hesitate to contact our office.

## 2023-04-13 DIAGNOSIS — G47.30 SLEEP APNEA, UNSPECIFIED TYPE: ICD-10-CM

## 2023-04-13 DIAGNOSIS — R06.83 SNORING: ICD-10-CM

## 2023-05-02 ENCOUNTER — APPOINTMENT (OUTPATIENT)
Dept: BEHAVIORAL HEALTH | Facility: CLINIC | Age: 48
End: 2023-05-02
Payer: COMMERCIAL

## 2023-06-01 ENCOUNTER — TELEMEDICINE (OUTPATIENT)
Dept: BEHAVIORAL HEALTH | Facility: CLINIC | Age: 48
End: 2023-06-01
Payer: COMMERCIAL

## 2023-06-01 DIAGNOSIS — F31.9 BIPOLAR 1 DISORDER (HCC): ICD-10-CM

## 2023-06-01 DIAGNOSIS — R06.83 SNORING: ICD-10-CM

## 2023-06-01 PROCEDURE — 90833 PSYTX W PT W E/M 30 MIN: CPT | Mod: 95 | Performed by: PSYCHIATRY & NEUROLOGY

## 2023-06-01 PROCEDURE — 99214 OFFICE O/P EST MOD 30 MIN: CPT | Mod: 95 | Performed by: PSYCHIATRY & NEUROLOGY

## 2023-06-01 RX ORDER — ARIPIPRAZOLE 15 MG/1
TABLET ORAL
Qty: 90 TABLET | Refills: 1 | Status: SHIPPED | OUTPATIENT
Start: 2023-06-01 | End: 2023-06-29 | Stop reason: SDUPTHER

## 2023-06-01 RX ORDER — ASENAPINE MALEATE 2.5 MG/1
2.5 TABLET SUBLINGUAL 2 TIMES DAILY
Qty: 180 TABLET | Refills: 1 | Status: SHIPPED
Start: 2023-06-01 | End: 2023-06-01

## 2023-06-01 NOTE — PROGRESS NOTES
[facility]  PSYCHIATRIC FOLLOW-UP NOTE    This evaluation was conducted via Zoom, using secure and encrypted videoconferencing technology. The patient was physically located at their home address in [place], [place], and the physician was located at her home office in [place], [place]. The patient was presented by self. The patient’s identity was confirmed and verbal consent for the telemedicine encounter was obtained.    CC:  Presents for follow up visit for medication evaluation and management        History Of Present Illness:  Fuentes Hawk is a 48 y.o. 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  The patient reported the following:  He reduced the dose of lithium ER to 1200 mg/day from 1350 mg/day as discussed due to his lithium level being 0.9.  He had a manic episode that lasted approx 1 week in early April, was having more difficulty sleeping, ruminating/worrying, couldn't stop thinking about different things, including things from the past and they bothered him a lot more than they should have.  His fiance' noticed, have been together x 4 years. He hasn't obtained his lithium level, uses [facility] but will obtain it tomorrow.  His manic episodes are more difficult to navigate than his depressive episodes.      History: He was diagnosed with Bipolar DO I approx 2005 and tried many medications and the combination of Lamictal and Lithium worked best for him.  His dose of Lithium was increased from  mg BID to TID in August when he felt that he was heading into a manic episode, was having hypomanic symptoms and having an elevated mood.  He fears is depressive episodes the most.  In the past he has developed SI, never made an attempt.  He went off of medications b/t 2011 - 2016, felt good and didn't think he needed them but  "then had a significant manic episode - needs less sleep, racing thoughts, not able to concentrate, risk taking behavior, no psychosis but in the past has had paranoid thinking and wanting to avoid people during a manic episode.  He has never needed hospitalization.  Manic episodes can last for a couple of weeks and depressive episodes can last up to 4 months.  Sleeps about 5 to 6 hours per night.    He first noticed Bipolar symptoms at age 13 and started drinking alcohol - no ETOH in 20 years, wasn't diagnosed until age 29.        ROS: As noted above in HPI.  Recent partial knee replacement that went really well.  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 Recovery with non-opioid pain treatment alternatives      Past Psychiatric History:  Denies any hospitalizations  Endorses hx of SI during a depressive episode. Denies any history SA or self harm  Medication trials:    Valproic Acid  Effexor  Zyprexa  Vraylar -\"felt flat\" \"no emotions\"      Family Psychiatric History:  None    Substance Use/Addiction History:  Alcohol:  None in 20 years - uses his sober support system  Cannabis:  None since age 20  Tobacco:  Quit smoking age 34  Caffeine:  up to 4 cups of coffee/day  Other:  Denies any other substances    Social History:  From [employer], works as a , previously  x 20 years, 3 children.  Denies any hx of abuse or trauma, enjoyed social aspect of school growing up.  Started struggling emotionally age 13 due to bipolar symptoms.  Hobbies:  endurance athlete - running and cycling, traveling, played baseball in college and his son now plays and enjoys this hobby with his son.    Allergies:  Ciprofloxacin and Other drug      Physical Examination and Mental Status Exam:  Vital signs: There were no vitals taken for this visit.    CONSTITUTIONAL:  General Appearance:  Clean, casual attire, good eye contact, engaged with " provider    ORIENTATION:  Oriented to time, place and person  RECENT AND REMOTE MEMORY:  Grossly intact  ATTENTION SPAN AND CONCENTRATION:  within normal range  LANGUAGE:  no deficits appreciated  FUND OF KNOWLEDGE:  has awareness of current events, past history and normal vocabulary  SPEECH:  normal volume, amount, rate and articulation, no perseveration or paucity of language  MOOD:  Euthymic  AFFECT:  Full, flexible  THOUGHT PROCESS:  logical and goal directed  THOUGHT CONTENT:  Denies any SI/HI or AVH, no delusional thinking nor preoccupations appreciated  ASSOCIATIONS:  Intact, not loose, no tangentiality or circumstantiality  MEMORY:  No gross evidence of memory deficits  JUDGMENT:  adequate concerning everyday activities  INSIGHT:  adequate to psychiatric condition    DIAGNOSTIC IMPRESSION:  1. Bipolar 1 disorder (HCC)  - ARIPiprazole (ABILIFY) 15 MG Tab; Take 1/2 tablet by mouth once a day for 14 days, then take 1 tablet by mouth once a day  Dispense: 90 Tablet; Refill: 1       Assessment and Plan:  The patient's risk of suicide is assessed as low.  Will be mindful he experiences the manic episodes to be more difficult to manage and the most disruptive over his depressive episodes.  FOLLOW UP NEXT VISIT ON HIS SNORING AND MARICARMEN - MANAGED?  USING CPAP?  1.  Bipolar DO I, most recent episode manic, improving  Caffeine Dependence  Hypovitaminosis D, level 21.5 Dec 9 labs  Alcohol Use DO, in sustained remission, even through a manic and depressive episode off medications  Since he had a manic episode on his current medications, he would benefit from better control/management of his bipolar DO.  Given his lithium level was at 0.9, at the higher end of normal at a higher lose of Lithium than he is on now, likely wise to add an adjunct medication, and then consider tapering down or off either Lamictal or Lithium.    Continue Lamictal 200 mg BID for now  Taper down caffeine to 1 cup/day - educated the patient about  impact to sleep and anxiety  Lithium level 0.9 on 12/9/22 on a total of 1350 mg/day  Continue reduced dose of Lithium ER to 1200 mg total/day from 1350 mg total/day, takes all in AM, therefore 0.9 after 24 hours is within the high range of normal  Obtain Lithium level and thyroid level  Begin Abilify 15 mg 1/2 x 14 days, then 1, Bipolar 1 Meche management; reviewed SE including TD which can be permanent, weight gain, metabolic syndrome  Reviewed medical record and most recent lab work  The patient was screened by his cardiologist and no need to change dose of Lamictal  Reviewed prior visit HPI, histories and treatment plan in preparation for today's visit      2.  The patient has a safety plan that includes calling the [phone] crisis line number, calling 911 and/or going to the nearest Emergency Department if symptoms worsen.      3.  Risks, benefits, alternatives and side effects were discussed for all medicines prescribed at this visit.  The patient voiced understanding providing informed consent.  The patient agrees to call the clinic with any questions or concerns, or seek emergent medical care if warranted.    4.  Follow up in 1 month or call sooner PRN    The proposed treatment plan was discussed with the patient who was provided the opportunity to ask questions and make suggestions regarding alternative treatment. Patient verbalized understanding and expressed agreement with the plan.     Greater than 16 minutes of the visit was spent in psychotherapy.     Psychotherapy include:  Supportive psychotherapy and psychoeducation, topics: his bipolar disorder when he is going into a manic phase, sometimes 0 to 100 and other times hypomanic first, has been able to abort a manic episode in the past when he has hypomania first.  His experience of meche.  Impact to brain of repeated manic episodes.  Meditation.        Joi Torres M.D.      This note was created using voice recognition software (Dragon). The accuracy  of the dictation is limited by the abilities of the software. I have reviewed the note prior to signing, however some errors in grammar and context are still possible. If you have any questions related to this note please do not hesitate to contact our office.

## 2023-06-29 ENCOUNTER — TELEMEDICINE (OUTPATIENT)
Dept: BEHAVIORAL HEALTH | Facility: CLINIC | Age: 48
End: 2023-06-29
Payer: COMMERCIAL

## 2023-06-29 DIAGNOSIS — F31.9 BIPOLAR 1 DISORDER (HCC): ICD-10-CM

## 2023-06-29 PROCEDURE — 99214 OFFICE O/P EST MOD 30 MIN: CPT | Mod: 95 | Performed by: PSYCHIATRY & NEUROLOGY

## 2023-06-29 RX ORDER — ARIPIPRAZOLE 15 MG/1
TABLET ORAL
Qty: 180 TABLET | Refills: 1 | Status: SHIPPED | OUTPATIENT
Start: 2023-06-29 | End: 2023-11-13

## 2023-06-29 RX ORDER — LITHIUM CARBONATE 300 MG/1
300 TABLET, FILM COATED, EXTENDED RELEASE ORAL 3 TIMES DAILY
Qty: 270 TABLET | Refills: 1 | Status: SHIPPED | OUTPATIENT
Start: 2023-06-29

## 2023-06-29 NOTE — PROGRESS NOTES
[facility]  PSYCHIATRIC FOLLOW-UP NOTE    This evaluation was conducted via Zoom, using secure and encrypted videoconferencing technology. The patient was physically located at their home address in [place], [place], and the physician was located at her home office in [place], [place]. The patient was presented by self. The patient’s identity was confirmed and verbal consent for the telemedicine encounter was obtained.    CC:  Presents for follow up visit for medication evaluation and management        History Of Present Illness:  Fuentes Patel (GOES BY DENNIS ZamoraHawk is a 48 y.o. 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    The patient reported the following:  He reduced the lithium ER to 900 mg/day from 1200 mg/day and added in the Abilify 15 mg 1/2 tablet for a week and then 1 full tablet for the last 3 weeks.  Last week he had a bad episode of paranoid thinking and feeling where he felt like someone was out to get him at work and felt like there was someone outside of his home and feeling in general suspicious of people and is a very scary experience.  No auditory hallucinations but he has had them in the past notably during a manic episode in his mid 30s when he was also experiencing paranoia.  The paranoia and the sary do not always go hand-in-hand sometimes he will have a manic episode without paranoia and sometimes he will have a paranoid episode without sary.  The paranoia last on average 10 days.  Last week was very scary and it is starting to get better.  He is not having any side effects to the Abilify, is worried about gaining weight, but has not, believes he has been eating more but this could be related to exercising more.  He is getting  in 2 days on Saturday.  He has not gotten his lithium level but received the lab  "work and agreed to go to [facility].      History: He was diagnosed with Bipolar DO I approx 2005 and tried many medications and the combination of Lamictal and Lithium worked best for him.  His dose of Lithium was increased from  mg BID to TID in August when he felt that he was heading into a manic episode, was having hypomanic symptoms and having an elevated mood.  He fears is depressive episodes the most.  In the past he has developed SI, never made an attempt.  He went off of medications b/t 2011 - 2016, felt good and didn't think he needed them but then had a significant manic episode - needs less sleep, racing thoughts, not able to concentrate, risk taking behavior, no psychosis but in the past has had paranoid thinking and wanting to avoid people during a manic episode.  He has never needed hospitalization.  Manic episodes can last for a couple of weeks and depressive episodes can last up to 4 months.  Sleeps about 5 to 6 hours per night.    He first noticed Bipolar symptoms at age 13 and started drinking alcohol - no ETOH in 20 years, wasn't diagnosed until age 29.        ROS: As noted above in HPI.  Recent partial knee replacement that went really well.  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 Recovery with non-opioid pain treatment alternatives      Past Psychiatric History:  Denies any hospitalizations  Endorses hx of SI during a depressive episode. Denies any history SA or self harm  Medication trials:    Valproic Acid  Effexor  Zyprexa  Vraylar -\"felt flat\" \"no emotions\"      Family Psychiatric History:  None    Substance Use/Addiction History:  Alcohol:  None in 20 years - uses his sober support system  Cannabis:  None since age 20  Tobacco:  Quit smoking age 34  Caffeine:  up to 4 cups of coffee/day  Other:  Denies any other substances    Social History:  From [employer], works as a , previously  " x 20 years, 3 children.  Denies any hx of abuse or trauma, enjoyed social aspect of school growing up.  Started struggling emotionally age 13 due to bipolar symptoms.  Hobbies:  endurance athlete - running and cycling, traveling, played baseball in college and his son now plays and enjoys this hobby with his son.    Allergies:  Ciprofloxacin and Other drug      Physical Examination and Mental Status Exam:  Vital signs: There were no vitals taken for this visit.    CONSTITUTIONAL:  General Appearance:  Clean, casual attire, good eye contact, engaged with provider    ORIENTATION:  Oriented to time, place and person  RECENT AND REMOTE MEMORY:  Grossly intact  ATTENTION SPAN AND CONCENTRATION:  within normal range  LANGUAGE:  no deficits appreciated  FUND OF KNOWLEDGE:  has awareness of current events, past history and normal vocabulary  SPEECH:  normal volume, amount, rate and articulation, no perseveration or paucity of language  MOOD:  Anxious  AFFECT:  Mood congruent, tearful  THOUGHT PROCESS:  logical and goal directed  THOUGHT CONTENT:  Denies any SI/HI or AVH, no delusional thinking nor preoccupations appreciated  ASSOCIATIONS:  Intact, not loose, no tangentiality or circumstantiality  MEMORY:  No gross evidence of memory deficits  JUDGMENT:  adequate concerning everyday activities  INSIGHT:  adequate to psychiatric condition    DIAGNOSTIC IMPRESSION:  1. Bipolar 1 disorder (HCC)  - ARIPiprazole (ABILIFY) 15 MG Tab; Take 1 tablet by mouth twice a day  Dispense: 180 Tablet; Refill: 1  - lithium carbonate  MG Tab CR tablet; Take 1 Tablet by mouth in the morning, at noon, and at bedtime.  Dispense: 270 Tablet; Refill: 1       Assessment and Plan:  The patient's risk of suicide is assessed as low.  Will be mindful he experiences the manic episodes to be more difficult to manage and the most disruptive over his depressive episodes.  FOLLOW UP NEXT VISIT ON HIS SNORING AND MARICARMEN - MANAGED?  USING CPAP?  1.  Bipolar  DO I, his symptoms meet criteria for Schizoaffective DO, Bipolar Type, multiple episodes, currently in acute episode, improving  Caffeine Dependence  Hypovitaminosis D, level 21.5 Dec 9 labs  Alcohol Use DO, in sustained remission, even through a manic and depressive episode off medications  Since he had a manic episode on his current medications, he would benefit from better control/management of his bipolar DO.  Given his lithium level was at 0.9, at the higher end of normal at a higher lose of Lithium than he is on now, likely wise to add an adjunct medication, and then consider tapering down or off either Lamictal or Lithium.    Continue Lamictal 200 mg BID for now  Taper down caffeine to 1 cup/day - educated the patient about impact to sleep and anxiety  Lithium level 0.9 on 12/9/22 on a total of 1350 mg/day  Continue reduced dose of Lithium ER to 900 mg total/day from 1350 mg total/day, now takes it TID, was taking all in the AM  Obtain Lithium level and thyroid level - his received the lab order in the mail, agreed to obtain TOMORROW  Increase Abilify 15 mg to 30 mg, as follows: to 1.5 (22.5 mg) qdaily x 7 days, then 1 BID (30 mg total/day), for Bipolar 1 [place] management; reviewed SE including TD which can be permanent, weight gain, metabolic syndrome  Reviewed medical record and most recent lab work  The patient was screened by his cardiologist and no need to change dose of Lamictal  Reviewed prior visit HPI, histories and treatment plan in preparation for today's visit      2.  The patient has a safety plan that includes calling the [phone] crisis line number, calling 911 and/or going to the nearest Emergency Department if symptoms worsen.      3.  Risks, benefits, alternatives and side effects were discussed for all medicines prescribed at this visit.  The patient voiced understanding providing informed consent.  The patient agrees to call the clinic with any questions or concerns, or seek emergent medical  care if warranted.    4.  Follow up in 1 month or call sooner PRN    The proposed treatment plan was discussed with the patient who was provided the opportunity to ask questions and make suggestions regarding alternative treatment. Patient verbalized understanding and expressed agreement with the plan.           Joi Torres M.D.      This note was created using voice recognition software (Dragon). The accuracy of the dictation is limited by the abilities of the software. I have reviewed the note prior to signing, however some errors in grammar and context are still possible. If you have any questions related to this note please do not hesitate to contact our office.

## 2023-07-26 ENCOUNTER — PATIENT MESSAGE (OUTPATIENT)
Dept: BEHAVIORAL HEALTH | Facility: CLINIC | Age: 48
End: 2023-07-26
Payer: COMMERCIAL

## 2023-07-26 DIAGNOSIS — F31.9 BIPOLAR 1 DISORDER (HCC): ICD-10-CM

## 2023-07-27 RX ORDER — LORAZEPAM 2 MG/1
2 TABLET ORAL
Qty: 7 TABLET | Refills: 0 | Status: SHIPPED | OUTPATIENT
Start: 2023-07-27 | End: 2023-08-03

## 2023-07-27 RX ORDER — DIVALPROEX SODIUM 500 MG/1
500 TABLET, EXTENDED RELEASE ORAL
Qty: 30 TABLET | Refills: 1 | Status: SHIPPED
Start: 2023-07-27 | End: 2023-08-07

## 2023-08-07 ENCOUNTER — TELEMEDICINE (OUTPATIENT)
Dept: BEHAVIORAL HEALTH | Facility: CLINIC | Age: 48
End: 2023-08-07
Payer: COMMERCIAL

## 2023-08-07 DIAGNOSIS — F31.9 BIPOLAR 1 DISORDER (HCC): ICD-10-CM

## 2023-08-07 DIAGNOSIS — Z79.899 HIGH RISK MEDICATION USE: ICD-10-CM

## 2023-08-07 PROCEDURE — 99214 OFFICE O/P EST MOD 30 MIN: CPT | Mod: 95 | Performed by: PSYCHIATRY & NEUROLOGY

## 2023-08-07 PROCEDURE — 90833 PSYTX W PT W E/M 30 MIN: CPT | Mod: 95 | Performed by: PSYCHIATRY & NEUROLOGY

## 2023-08-07 RX ORDER — DIVALPROEX SODIUM 500 MG/1
1000 TABLET, EXTENDED RELEASE ORAL
Qty: 180 TABLET | Refills: 1 | Status: SHIPPED | OUTPATIENT
Start: 2023-08-07 | End: 2023-08-21 | Stop reason: SDUPTHER

## 2023-08-07 RX ORDER — LAMOTRIGINE 200 MG/1
200 TABLET ORAL 2 TIMES DAILY
Qty: 180 TABLET | Refills: 1 | Status: SHIPPED | OUTPATIENT
Start: 2023-08-07

## 2023-08-07 NOTE — PROGRESS NOTES
"[facility]  PSYCHIATRIC FOLLOW-UP NOTE    This evaluation was conducted via Zoom, using secure and encrypted videoconferencing technology. The patient was physically located at their home address in [place], [place], and the physician was located at her home office in [place], [place]. The patient was presented by self. The patient’s identity was confirmed and verbal consent for the telemedicine encounter was obtained.    CC:  Presents for follow up visit for medication evaluation and management      History Of Present Illness:  Fuentes Patel (GOES BY DENNIS Hawk is a 48 y.o. 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    The patient reported the following:  He continued the lithium er 900 mg, abilify 30 mg and the lamictal 200 mg BID and still had a hypomanic episode.  The addition of the depakote  mg QHS helped abort this episode.  He feels like he is recovering from a \"brain bruise\" as if his brain was squeezed during the hypomanic episode.  He wasn't sleeping well, had trouble concentrating, things seemed more difficult, ex. Trying to meditate and the paranoia continued until he started the Depakote.  He agreed to obtain lab work from [facility].        History: He was diagnosed with Bipolar DO I approx 2005 and tried many medications and the combination of Lamictal and Lithium worked best for him.  His dose of Lithium was increased from  mg BID to TID in August when he felt that he was heading into a manic episode, was having hypomanic symptoms and having an elevated mood.  He fears is depressive episodes the most.  In the past he has developed SI, never made an attempt.  He went off of medications b/t 2011 - 2016, felt good and didn't think he needed them but then had a significant manic episode - needs less sleep, racing " "thoughts, not able to concentrate, risk taking behavior, no psychosis but in the past has had paranoid thinking and wanting to avoid people during a manic episode.  He has never needed hospitalization.  Manic episodes can last for a couple of weeks and depressive episodes can last up to 4 months.  Sleeps about 5 to 6 hours per night.    He first noticed Bipolar symptoms at age 13 and started drinking alcohol - no ETOH in 20 years, wasn't diagnosed until age 29.        ROS: As noted above in HPI.  Recent partial knee replacement that went really well.  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 Recovery with non-opioid pain treatment alternatives      Past Psychiatric History:  Denies any hospitalizations  Endorses hx of SI during a depressive episode. Denies any history SA or self harm  Medication trials:    Valproic Acid  Effexor  Zyprexa  Vraylar -\"felt flat\" \"no emotions\"      Family Psychiatric History:  None    Substance Use/Addiction History:  Alcohol:  None in 20 years - uses his sober support system  Cannabis:  None since age 20  Tobacco:  Quit smoking age 34  Caffeine:  up to 4 cups of coffee/day  Other:  Denies any other substances    Social History:  From [employer], works as a , previously  x 20 years, 3 children.  Denies any hx of abuse or trauma, enjoyed social aspect of school growing up.  Started struggling emotionally age 13 due to bipolar symptoms.  Hobbies:  endurance athlete - running and cycling, traveling, played baseball in college and his son now plays and enjoys this hobby with his son.    Allergies:  Ciprofloxacin and Other drug      Physical Examination and Mental Status Exam:  Vital signs: There were no vitals taken for this visit.    CONSTITUTIONAL:  General Appearance:  Clean, casual attire, good eye contact, engaged with provider    ORIENTATION:  Oriented to time, place and " person  RECENT AND REMOTE MEMORY:  Grossly intact  ATTENTION SPAN AND CONCENTRATION:  within normal range  LANGUAGE:  no deficits appreciated  FUND OF KNOWLEDGE:  has awareness of current events, past history and normal vocabulary  SPEECH:  normal volume, amount, rate and articulation, no perseveration or paucity of language  MOOD:  Euthymic  AFFECT:  Full range  THOUGHT PROCESS:  logical and goal directed  THOUGHT CONTENT:  Denies any SI/HI or AVH, no delusional thinking nor preoccupations appreciated  ASSOCIATIONS:  Intact, not loose, no tangentiality or circumstantiality  MEMORY:  No gross evidence of memory deficits  JUDGMENT:  adequate concerning everyday activities  INSIGHT:  adequate to psychiatric condition    DIAGNOSTIC IMPRESSION:  1. Bipolar 1 disorder (HCC)  - divalproex ER (DEPAKOTE ER) 500 MG TABLET SR 24 HR; Take 2 Tablets by mouth at bedtime.  Dispense: 180 Tablet; Refill: 1  - lamotrigine (LAMICTAL) 200 MG tablet; Take 1 Tablet by mouth 2 times a day.  Dispense: 180 Tablet; Refill: 1    2. High risk medication use  - VALPROIC ACID; Future       Assessment and Plan:  The patient's risk of suicide is assessed as low.  Will be mindful he experiences the manic episodes to be more difficult to manage and the most disruptive over his depressive episodes.  FOLLOW UP NEXT VISIT ON HIS SNORING AND MARICARMEN - MANAGED?  USING CPAP?  1.  Bipolar DO I, his symptoms meet criteria for Schizoaffective DO, Bipolar Type, multiple episodes, most recent episode hypomanic/manic with paranoid thinking, in remission  Caffeine Dependence  Hypovitaminosis D, level 21.5 Dec 9 labs  Alcohol Use DO, in sustained remission, even through a manic and depressive episode off medications  Thinking as of 6/29/23 regarding his medications: Since he had a manic episode on his current medications (Lithium  mg, Abilify 15 mg, Lamictal 200 mg BID) , he would benefit from better control/management of his bipolar DO.  Given his lithium  level was at 0.9, at the higher end of normal at a higher dose of Lithium than he is on now, likely wise to add an adjunct medication, and then consider tapering down or off either Lamictal or Lithium.    Continue Lamictal 200 mg BID for now  6/29/23 visit: Taper down caffeine to 1 cup/day - educated the patient about impact to sleep and anxiety  Lithium level 0.9 on 12/9/22 on a total of 1350 mg/day  Reduce further: Lithium ER from 900 mg down to 600 mg QHS x 7 days, then 300 mg QHS, plan to hold at 300 mg given neuroprotective properties of Lithium, note: max total/day was 1350 mg  Obtain lab work in 4 to 5 weeks, after tapering down the Lithium  Decrease Abilify back to 15 mg from 30 mg, note, had increased to 30 mg at his 6/29/23 visit and he obtained no benefit from it for his Bipolar DO, therefore, will taper off of it given it's SE profile  The patient was screened by his cardiologist and no need to change dose of Lamictal  Reviewed prior visit HPI, histories and treatment plan in preparation for today's visit    2.  The patient has a safety plan that includes calling the 1-800 crisis line number, calling 911 and/or going to the nearest Emergency Department if symptoms worsen.      3.  Risks, benefits, alternatives and side effects were discussed for all medicines prescribed at this visit.  The patient voiced understanding providing informed consent.  The patient agrees to call the clinic with any questions or concerns, or seek emergent medical care if warranted.    4.  Follow up in 7 weeks or call sooner PRN    The proposed treatment plan was discussed with the patient who was provided the opportunity to ask questions and make suggestions regarding alternative treatment. Patient verbalized understanding and expressed agreement with the plan.     Greater than 16 minutes of the visit was spent in psychotherapy.     Psychotherapy include:  Supportive psychotherapy and psychoeducation, topics: impact of sary on  the brain as one ages, research on this topic, what kind of impact, protective effect of Lithium is possible.            Joi Torres M.D.      This note was created using voice recognition software (Dragon). The accuracy of the dictation is limited by the abilities of the software. I have reviewed the note prior to signing, however some errors in grammar and context are still possible. If you have any questions related to this note please do not hesitate to contact our office.

## 2023-08-08 ENCOUNTER — TELEPHONE (OUTPATIENT)
Dept: HEALTH INFORMATION MANAGEMENT | Facility: OTHER | Age: 48
End: 2023-08-08
Payer: COMMERCIAL

## 2023-08-09 ENCOUNTER — APPOINTMENT (OUTPATIENT)
Dept: SLEEP MEDICINE | Facility: MEDICAL CENTER | Age: 48
End: 2023-08-09
Attending: NURSE PRACTITIONER
Payer: COMMERCIAL

## 2023-08-21 ENCOUNTER — PATIENT MESSAGE (OUTPATIENT)
Dept: BEHAVIORAL HEALTH | Facility: CLINIC | Age: 48
End: 2023-08-21
Payer: COMMERCIAL

## 2023-08-21 DIAGNOSIS — F31.9 BIPOLAR 1 DISORDER (HCC): ICD-10-CM

## 2023-08-21 RX ORDER — DIVALPROEX SODIUM 500 MG/1
1000 TABLET, EXTENDED RELEASE ORAL
Qty: 180 TABLET | Refills: 0 | Status: SHIPPED | OUTPATIENT
Start: 2023-08-21 | End: 2023-11-16

## 2023-08-21 NOTE — PATIENT COMMUNICATION
Received request via: Patient    Was the patient seen in the last year in this department? Yes    Does the patient have an active prescription (recently filled or refills available) for medication(s) requested? No    Does the patient have long-term Plus and need 100 day supply (blood pressure, diabetes and cholesterol meds only)? Medication is not for cholesterol, blood pressure or diabetes and Patient does not have SCP

## 2023-11-12 DIAGNOSIS — F31.9 BIPOLAR 1 DISORDER (HCC): ICD-10-CM

## 2023-11-13 RX ORDER — ARIPIPRAZOLE 15 MG/1
15 TABLET ORAL DAILY
Qty: 90 TABLET | Refills: 1 | Status: SHIPPED | OUTPATIENT
Start: 2023-11-13

## 2023-11-15 DIAGNOSIS — F31.9 BIPOLAR 1 DISORDER (HCC): ICD-10-CM

## 2023-11-16 RX ORDER — DIVALPROEX SODIUM 500 MG/1
TABLET, EXTENDED RELEASE ORAL
Qty: 180 TABLET | Refills: 1 | Status: SHIPPED | OUTPATIENT
Start: 2023-11-16

## 2024-04-08 DIAGNOSIS — F31.9 BIPOLAR 1 DISORDER (HCC): ICD-10-CM

## 2024-04-08 RX ORDER — LITHIUM CARBONATE 300 MG/1
TABLET, FILM COATED, EXTENDED RELEASE ORAL
Qty: 120 TABLET | OUTPATIENT
Start: 2024-04-08

## 2024-04-08 NOTE — TELEPHONE ENCOUNTER
Last seen 8/2023 and no follow up scheduled     Received request via: Pharmacy    Was the patient seen in the last year in this department? Yes    Does the patient have an active prescription (recently filled or refills available) for medication(s) requested? No    Pharmacy Name: [facility]    Does the patient have long term Plus and need 100 day supply (blood pressure, diabetes and cholesterol meds only)? Medication is not for cholesterol, blood pressure or diabetes and Patient does not have SCP

## 2024-04-09 RX ORDER — LITHIUM CARBONATE 300 MG/1
300 TABLET, FILM COATED, EXTENDED RELEASE ORAL 3 TIMES DAILY
Qty: 90 TABLET | Refills: 1 | Status: SHIPPED | OUTPATIENT
Start: 2024-04-09

## 2024-04-09 NOTE — TELEPHONE ENCOUNTER
Left [employer] message for patient to call our office [phone] to schedule an appointment.

## 2024-04-13 DIAGNOSIS — F31.9 BIPOLAR 1 DISORDER (HCC): ICD-10-CM

## 2024-04-15 NOTE — TELEPHONE ENCOUNTER
Last seen 8/2023 No f/u scheduled    Received request via: Pharmacy    Was the patient seen in the last year in this department? Yes    Does the patient have an active prescription (recently filled or refills available) for medication(s) requested? No    [facility] Name[facility]    Does the patient have FDC Plus and need 100 day supply (blood pressure, diabetes and cholesterol meds only)? Medication is not for cholesterol, blood pressure or diabetes and Patient does not have SCP

## 2024-04-16 RX ORDER — LAMOTRIGINE 200 MG/1
200 TABLET ORAL 2 TIMES DAILY
Qty: 60 TABLET | Refills: 1 | Status: SHIPPED | OUTPATIENT
Start: 2024-04-16

## 2024-04-16 NOTE — TELEPHONE ENCOUNTER
Left [employer] message for patient to call our office [phone] to schedule an appointment.

## 2024-06-14 ENCOUNTER — PATIENT MESSAGE (OUTPATIENT)
Dept: BEHAVIORAL HEALTH | Facility: CLINIC | Age: 49
End: 2024-06-14

## 2024-07-03 DIAGNOSIS — F31.9 BIPOLAR 1 DISORDER (HCC): ICD-10-CM

## 2024-07-05 RX ORDER — LAMOTRIGINE 200 MG/1
200 TABLET ORAL 2 TIMES DAILY
Qty: 60 TABLET | Refills: 1 | Status: SHIPPED | OUTPATIENT
Start: 2024-07-05

## 2024-07-05 RX ORDER — ARIPIPRAZOLE 15 MG/1
15 TABLET ORAL DAILY
Qty: 90 TABLET | Refills: 1 | Status: SHIPPED | OUTPATIENT
Start: 2024-07-05